# Patient Record
Sex: MALE | Race: WHITE | NOT HISPANIC OR LATINO | Employment: OTHER | URBAN - METROPOLITAN AREA
[De-identification: names, ages, dates, MRNs, and addresses within clinical notes are randomized per-mention and may not be internally consistent; named-entity substitution may affect disease eponyms.]

---

## 2023-10-18 DIAGNOSIS — B36.9 FUNGAL DERMATITIS: Primary | ICD-10-CM

## 2023-10-19 RX ORDER — NYSTATIN 100000 U/G
CREAM TOPICAL 2 TIMES DAILY
COMMUNITY
Start: 2023-05-19

## 2023-10-19 RX ORDER — NYSTATIN 100000 U/G
CREAM TOPICAL 2 TIMES DAILY
Refills: 3 | Status: CANCELLED | OUTPATIENT
Start: 2023-10-19 | End: 2023-11-18

## 2023-10-19 RX ORDER — TAMSULOSIN HYDROCHLORIDE 0.4 MG/1
0.4 CAPSULE ORAL DAILY
Qty: 90 CAPSULE | Refills: 3 | Status: CANCELLED | OUTPATIENT
Start: 2023-10-19 | End: 2024-10-18

## 2023-10-19 RX ORDER — NYSTATIN 100000 U/G
CREAM TOPICAL 2 TIMES DAILY
Qty: 60 G | Refills: 1 | Status: SHIPPED | OUTPATIENT
Start: 2023-10-19 | End: 2024-03-20

## 2024-01-22 DIAGNOSIS — I10 HYPERTENSION, UNSPECIFIED TYPE: Primary | ICD-10-CM

## 2024-01-23 RX ORDER — METOPROLOL TARTRATE 50 MG/1
50 TABLET ORAL 2 TIMES DAILY
Qty: 180 TABLET | Refills: 3 | Status: SHIPPED | OUTPATIENT
Start: 2024-01-23

## 2024-03-01 DIAGNOSIS — B36.9 FUNGAL DERMATITIS: ICD-10-CM

## 2024-03-20 RX ORDER — NYSTATIN 100000 U/G
CREAM TOPICAL 2 TIMES DAILY
Qty: 60 G | Refills: 1 | Status: SHIPPED | OUTPATIENT
Start: 2024-03-20

## 2024-03-29 ENCOUNTER — TELEPHONE (OUTPATIENT)
Dept: PRIMARY CARE | Facility: CLINIC | Age: 89
End: 2024-03-29
Payer: MEDICARE

## 2024-03-29 NOTE — TELEPHONE ENCOUNTER
Patients wife called wanting to know if you would be willing to do virtual appointments with Art.  She stated that he is bed ridden.

## 2024-04-03 ENCOUNTER — TELEMEDICINE (OUTPATIENT)
Dept: PRIMARY CARE | Facility: CLINIC | Age: 89
End: 2024-04-03
Payer: MEDICARE

## 2024-04-03 DIAGNOSIS — L98.491 CHRONIC SKIN ULCER, LIMITED TO BREAKDOWN OF SKIN (MULTI): Primary | ICD-10-CM

## 2024-04-03 PROCEDURE — 1157F ADVNC CARE PLAN IN RCRD: CPT | Performed by: FAMILY MEDICINE

## 2024-04-03 PROCEDURE — 99213 OFFICE O/P EST LOW 20 MIN: CPT | Performed by: FAMILY MEDICINE

## 2024-04-03 RX ORDER — DOXYCYCLINE 100 MG/1
100 CAPSULE ORAL 2 TIMES DAILY
Qty: 14 CAPSULE | Refills: 0 | Status: SHIPPED | OUTPATIENT
Start: 2024-04-03 | End: 2024-04-10

## 2024-04-03 RX ORDER — MUPIROCIN 20 MG/G
OINTMENT TOPICAL
Qty: 15 G | Refills: 0 | Status: SHIPPED | OUTPATIENT
Start: 2024-04-03 | End: 2024-04-13

## 2024-04-03 ASSESSMENT — ENCOUNTER SYMPTOMS: WOUND: 1

## 2024-04-03 NOTE — PROGRESS NOTES
Subjective   Patient ID: Reilly Feliz is a 88 y.o. male who presents for No chief complaint on file..    HPI   Reilly is presenting virtually today for concerns of skin lesions that have been there for a few months. Family has tried neosporin and hydrocortisone without relief.   Patient believes that it is painful to touch when the scab peels off.   Patient is in hospital bed, mostly bed bound.       Review of Systems   Skin:  Positive for wound.        Multiple non healing skin lesions.   All other systems reviewed and are negative.      Objective   There were no vitals taken for this visit.  Unable to attain due to nature of this visit     Physical Exam  Constitutional:       Appearance: Normal appearance.   HENT:      Head: Normocephalic.      Nose: Nose normal.   Eyes:      Conjunctiva/sclera: Conjunctivae normal.   Pulmonary:      Effort: Pulmonary effort is normal.   Skin:     Findings: Lesion present.      Comments: Multiple non healing skin lesions. Largest one on the left temple with scabbing  Left side of face and auricles with non healing lesion  Weeping lesion on right lateral thigh raised   Neurological:      Mental Status: He is alert. Mental status is at baseline.   Psychiatric:         Mood and Affect: Mood normal.         Behavior: Behavior normal.         Thought Content: Thought content normal.         Judgment: Judgment normal.         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 Take with at least 8 ounces (large glass) of water, do not lie down for 30 minutes after  -     mupirocin (Bactroban) 2 % ointment; Apply topically 3 times a day for 10 days.    Reilly is a 89yo M bed bound presenting virtually for non healing skin lesions that family previously tried hydrocortisone and neosporin creams.  An obstacle to  consider is the lack of transportation and mobility of the patient to present to dermatology for a formal evaluation and skin biopsy.   Concerns of squamous cell carcinoma vs ulceration vs infection  Antibiotic capsule and cream prescribed today as initial treatment option. Should it not provide any relief in 1 week will reassess how to transport patient to a dermatology office.   Explained to family our concerns and the issue of mobility of the patient.  Will reassess at next virtual visit    I have personally reviewed all available pertinent labs, imaging, and consult notes with the patient.     All questions and concerns were addressed. Patient verbalizes understanding instructions and agrees with established plan of care.     Patient seen and discussed with Dr. Geronimo Arvizu MD

## 2024-04-03 NOTE — PROGRESS NOTES
I reviewed and examined the patient. I was present for the key exam elements, and I fully participated in the patient's care. I discussed the management of the care with the resident. I have personally reviewed the pertinent labs and imaging, as well as recent notes, with the patient. I have reviewed the note above and agree with the resident's medical decision making as documented in the resident's note, in addition to the following comments / findings:     Agree with the rest of the plan outlined below by resident physician. No red flags.      The patient understands and agrees to the assessment and plan of care. Patient has also agreed to follow up and comply with the treatment and evaluation as recommended today. Patient was instructed to call the office at [phone] should questions arise regarding their treatment or care.     Paul Melton DO, FAOASM  Family Medicine   [address], [address]  [place] [address]     Paul Melton DO

## 2024-04-21 DIAGNOSIS — B36.9 FUNGAL DERMATITIS: ICD-10-CM

## 2024-04-23 RX ORDER — NYSTATIN 100000 U/G
CREAM TOPICAL 2 TIMES DAILY
Qty: 60 G | Refills: 1 | OUTPATIENT
Start: 2024-04-23

## 2024-04-25 ENCOUNTER — TELEPHONE (OUTPATIENT)
Dept: PRIMARY CARE | Facility: CLINIC | Age: 89
End: 2024-04-25
Payer: MEDICARE

## 2024-04-25 DIAGNOSIS — I63.9 CEREBROVASCULAR ACCIDENT (CVA), UNSPECIFIED MECHANISM (MULTI): Primary | ICD-10-CM

## 2024-04-25 NOTE — TELEPHONE ENCOUNTER
Patients wife called asking if you would write an order for Art for home health care.  They are going to try to use [facility]

## 2024-04-26 ENCOUNTER — HOME HEALTH ADMISSION (OUTPATIENT)
Dept: HOME HEALTH SERVICES | Facility: HOME HEALTH | Age: 89
End: 2024-04-26
Payer: MEDICARE

## 2024-04-26 ENCOUNTER — TELEPHONE (OUTPATIENT)
Dept: HOME HEALTH SERVICES | Facility: HOME HEALTH | Age: 89
End: 2024-04-26
Payer: MEDICARE

## 2024-04-26 ENCOUNTER — DOCUMENTATION (OUTPATIENT)
Dept: HOME HEALTH SERVICES | Facility: HOME HEALTH | Age: 89
End: 2024-04-26
Payer: MEDICARE

## 2024-04-26 NOTE — TELEPHONE ENCOUNTER
I you referred this pt to [facility] for a HHA only and pt would have to have a skilled need for home care ie. SN, PT, OT. After reviewing the chart I see this referral was for [facility] home care and was sent to [facility] in error. We will make this pt a non admit. Thanks

## 2024-04-26 NOTE — TELEPHONE ENCOUNTER
I you referred this pt to [facility] for a HHA only and pt would have to have a skilled need for home care ie. SN, PT, OT. After reviewing the chart I see this referral was for [facility] home care and was sent to [facility] in error. We will make this pt a non admit. Thanks

## 2024-04-29 NOTE — TELEPHONE ENCOUNTER
Called patients wife and let her know.   [facility] does not provide all the services on referral.  I called [facility] home care and they are going to start the process on their end and call patient and let her know

## 2024-04-30 ENCOUNTER — DOCUMENTATION (OUTPATIENT)
Dept: HOME HEALTH SERVICES | Facility: HOME HEALTH | Age: 89
End: 2024-04-30

## 2024-04-30 ENCOUNTER — TELEPHONE (OUTPATIENT)
Dept: HOME HEALTH SERVICES | Facility: HOME HEALTH | Age: 89
End: 2024-04-30

## 2024-04-30 NOTE — HH CARE COORDINATION
Home Care received a referral for Home Health Aide. Unfortunately, we are unable to accept and process the referral at this time.    Patients, please reach out to the referring provider or your PCP to assist in obtaining an alternative home care agency and/or guidance to meet your needs.    Providers, please reach out to  Home Care with any questions regarding the declined referral.

## 2024-04-30 NOTE — TELEPHONE ENCOUNTER
04/30/2024 Hello, We received a referral for [facility] for HHA. [facility] provides skilled services only. If there is a need for skilled services for this patient please submit a new referral with skilled disciplines ordered. The existing referral has been closed. Thank you, Amy

## 2024-05-02 ENCOUNTER — TELEPHONE (OUTPATIENT)
Dept: HOME HEALTH SERVICES | Facility: HOME HEALTH | Age: 89
End: 2024-05-02

## 2024-05-02 ENCOUNTER — HOME HEALTH ADMISSION (OUTPATIENT)
Dept: HOME HEALTH SERVICES | Facility: HOME HEALTH | Age: 89
End: 2024-05-02
Payer: MEDICARE

## 2024-05-02 ENCOUNTER — DOCUMENTATION (OUTPATIENT)
Dept: HOME HEALTH SERVICES | Facility: HOME HEALTH | Age: 89
End: 2024-05-02

## 2024-05-02 DIAGNOSIS — I63.9 CEREBROVASCULAR ACCIDENT (CVA), UNSPECIFIED MECHANISM (MULTI): Primary | ICD-10-CM

## 2024-05-02 NOTE — TELEPHONE ENCOUNTER
Moises Melton,     Thank you for the new referral for Reilly Feliz. Please be advised that we do not currently have Home Health Aide and Speech Language Pathology in this service area. We can process the referral for all other disciplines at this time. If the missing disciplines are required, please send this referral to another agency.     Thank you,  [facility] Intake

## 2024-05-02 NOTE — HH CARE COORDINATION
Home Care received a referral for Nursing, Physical Therapy, Occupational Therapy, Home Health Aide, and Speech Language Pathology. Unfortunately, we are unable to accept and process the referral at this time.    Patients, please reach out to the referring provider or your PCP to assist in obtaining an alternative home care agency and/or guidance to meet your needs.    Providers, please reach out to  Home Care with any questions regarding the declined referral.

## 2024-05-06 DIAGNOSIS — I63.9 CEREBROVASCULAR ACCIDENT (CVA), UNSPECIFIED MECHANISM (MULTI): Primary | ICD-10-CM

## 2024-05-07 ENCOUNTER — TELEPHONE (OUTPATIENT)
Dept: HOME HEALTH SERVICES | Facility: HOME HEALTH | Age: 89
End: 2024-05-07

## 2024-05-07 DIAGNOSIS — I63.9 CEREBROVASCULAR ACCIDENT (CVA), UNSPECIFIED MECHANISM (MULTI): Primary | ICD-10-CM

## 2024-05-07 NOTE — TELEPHONE ENCOUNTER
Hello, We received a referral for [facility] for HHA without any other skilled needs(SN,PT,OT).  [facility] requires skilled services additional to receiving HHA.  If there is a need for skilled services for this patient please submit a new referral with skilled disciplines ordered. Also [facility] currently do not have any HHA available to service pts zip code area. We apologize for any inconvenience. The existing referral has been closed. Thank you, Mary Jo

## 2024-05-13 DIAGNOSIS — B36.9 FUNGAL DERMATITIS: ICD-10-CM

## 2024-05-13 RX ORDER — NYSTATIN 100000 U/G
CREAM TOPICAL 2 TIMES DAILY
Qty: 60 G | Refills: 0 | Status: SHIPPED | OUTPATIENT
Start: 2024-05-13

## 2024-05-15 RX ORDER — MUPIROCIN 20 MG/G
OINTMENT TOPICAL 3 TIMES DAILY
Qty: 22 G | Refills: 0 | OUTPATIENT
Start: 2024-05-15 | End: 2024-05-25

## 2024-05-15 NOTE — TELEPHONE ENCOUNTER
Patient needs refill on Mupirocin 2% ointment apply three times a day   22  Pharmacy [facility]

## 2024-05-21 DIAGNOSIS — L03.90 CELLULITIS, UNSPECIFIED CELLULITIS SITE: Primary | ICD-10-CM

## 2024-05-21 RX ORDER — MUPIROCIN CALCIUM 20 MG/G
CREAM TOPICAL 3 TIMES DAILY
Qty: 15 G | Refills: 0 | Status: SHIPPED | OUTPATIENT
Start: 2024-05-21 | End: 2024-05-24

## 2024-05-21 NOTE — TELEPHONE ENCOUNTER
Patient is asking for mupirocin 2% ointment apply three times a day.  Does he still need to use?  Spot is better but the spot is still there.  Pharmacy [facility]

## 2024-05-22 DIAGNOSIS — L03.90 CELLULITIS, UNSPECIFIED CELLULITIS SITE: ICD-10-CM

## 2024-05-22 RX ORDER — MUPIROCIN CALCIUM 20 MG/G
CREAM TOPICAL 3 TIMES DAILY
Qty: 15 G | Refills: 0 | OUTPATIENT
Start: 2024-05-22 | End: 2024-06-01

## 2024-05-23 DIAGNOSIS — L03.90 CELLULITIS, UNSPECIFIED CELLULITIS SITE: ICD-10-CM

## 2024-05-24 RX ORDER — MUPIROCIN CALCIUM 20 MG/G
CREAM TOPICAL 3 TIMES DAILY
Qty: 15 G | Refills: 0 | Status: SHIPPED | OUTPATIENT
Start: 2024-05-24 | End: 2024-06-03

## 2024-06-05 ENCOUNTER — TELEPHONE (OUTPATIENT)
Dept: PRIMARY CARE | Facility: CLINIC | Age: 89
End: 2024-06-05
Payer: MEDICARE

## 2024-06-05 NOTE — TELEPHONE ENCOUNTER
Patient's wife called asking for a new order for Homehealth.  You had put one in prior but patient's insurance only has certain ones that are in network for him.  Wife will get fax number to fax in order.  Fax number is [phone]

## 2024-07-12 DIAGNOSIS — B36.9 FUNGAL DERMATITIS: ICD-10-CM

## 2024-07-12 RX ORDER — NYSTATIN 100000 U/G
CREAM TOPICAL 2 TIMES DAILY
Qty: 60 G | Refills: 3 | Status: SHIPPED | OUTPATIENT
Start: 2024-07-12

## 2024-08-13 ENCOUNTER — TELEPHONE (OUTPATIENT)
Dept: PRIMARY CARE | Facility: CLINIC | Age: 89
End: 2024-08-13
Payer: MEDICARE

## 2024-08-13 NOTE — TELEPHONE ENCOUNTER
[facility] Nurse Heather called because she had spoken to the patient's wife and he has a bad sore on his back.  Patient is bed bound and insurance said they would contact PCP and see if you could put an order in for that.

## 2024-08-14 NOTE — TELEPHONE ENCOUNTER
Dr Melton wrote up order for home health nurse.  Spoke with insurance they will set up for a case advisor that will contact patient to help set him up with home health that is on patient's insurance

## 2024-08-26 ENCOUNTER — TELEPHONE (OUTPATIENT)
Dept: PRIMARY CARE | Facility: CLINIC | Age: 89
End: 2024-08-26
Payer: MEDICARE

## 2024-08-26 NOTE — TELEPHONE ENCOUNTER
Wife called wanted order for home care faxed to another facilitly.  Faxed to [facility] fax # [phone]

## 2024-08-30 ENCOUNTER — TELEMEDICINE (OUTPATIENT)
Dept: PRIMARY CARE | Facility: CLINIC | Age: 89
End: 2024-08-30
Payer: MEDICARE

## 2024-08-30 DIAGNOSIS — Z74.01 BED CONFINEMENT STATUS: ICD-10-CM

## 2024-08-30 DIAGNOSIS — S31.000D WOUND OF SACRAL REGION, SUBSEQUENT ENCOUNTER: ICD-10-CM

## 2024-08-30 DIAGNOSIS — G20.A1 PARKINSON'S DISEASE WITHOUT FLUCTUATING MANIFESTATIONS, UNSPECIFIED WHETHER DYSKINESIA PRESENT (MULTI): ICD-10-CM

## 2024-08-30 DIAGNOSIS — L98.491 CHRONIC SKIN ULCER, LIMITED TO BREAKDOWN OF SKIN (MULTI): Primary | ICD-10-CM

## 2024-08-30 ASSESSMENT — ENCOUNTER SYMPTOMS
ACTIVITY CHANGE: 1
WEAKNESS: 1
CONSTIPATION: 1
UNEXPECTED WEIGHT CHANGE: 0
APPETITE CHANGE: 1
SHORTNESS OF BREATH: 0

## 2024-08-30 NOTE — PROGRESS NOTES
Chief Complaint Reilly Feliz is a 88 y.o. male who presents via virtual telehealth visit for help for home health care.     HPI:  Both patient and his wife gave history. Patient stated that he has Parkinson's Disease and now he is bed bound because of it. The wife endorses that he became bed bound after having COVID and has continued to deteriorate to the point where he cannot get out of his hospital bed and because of this the patient has skin issues and pressure sores. Patient wife stated that they would like home health care to help her take care of him and to help treat his pressure wounds. They would also need PT and OT for strengthening.       ROS:  Review of Systems   Constitutional:  Positive for activity change and appetite change. Negative for unexpected weight change.   Respiratory:  Negative for shortness of breath.    Cardiovascular:  Negative for chest pain.   Gastrointestinal:  Positive for constipation.   Neurological:  Positive for weakness.       Meds:    Current Outpatient Medications:     carbidopa-levodopa (Sinemet)  mg tablet, TAKE 1 TABLET BY MOUTH 6 TIMES A DAY, Disp: 540 tablet, Rfl: 2    metFORMIN (Glucophage) 500 mg tablet, TAKE 1 TABLET TWICE DAILY, Disp: 180 tablet, Rfl: 3    metoprolol tartrate (Lopressor) 50 mg tablet, TAKE 1 TABLET TWICE DAILY, Disp: 180 tablet, Rfl: 3    nystatin (Mycostatin) cream, Apply topically 2 times a day., Disp: , Rfl:     nystatin (Mycostatin) cream, APPLY TO AFFECTED AREA TWICE A DAY, Disp: 60 g, Rfl: 1    nystatin (Mycostatin) cream, APPLY TO AFFECTED AREA TWICE A DAY, Disp: 60 g, Rfl: 3    Allergies:  No Known Allergies    PE:  There were no vitals taken for this visit.  Limited due to virtual telehealth vist.      Assessment/Plan  Reilly Feliz is a 88 y.o. male who presents for referral for home health care. Discussed the current bed bound status of the patient with him and his wife. Would recommend both Healthy at Home program for a  physician to come in to see him to help evaluate his needs and manage his care. It was also recommended that he get home health care with PT, OT, Wound Care, Home Health Aide, and Skilled Nursing. Also due to the patient's poor prognosis for recovery with the Parkinson's it was recommended that they meet with palliative care to have a goals of care discussion. There was also an order for a pressure support mattress. Patient and his family agreed with treatment and plan.     Diagnoses and all orders for this visit:  Chronic skin ulcer, limited to breakdown of skin (Multi) (Primary)  -     Referral to Healthy at Home Program; Future  -     General supply request Pressure Support Mattress  Parkinson's disease without fluctuating manifestations, unspecified whether dyskinesia present (Multi)  -     Referral to Home Care; Future  -     Referral to Healthy at Home Program; Future  -     General supply request Pressure Support Mattress  -     Referral to Palliative Care; Future  -     Referral to Home Care; Future  Wound of sacral region, subsequent encounter  Bed confinement status  -     Referral to Home Care; Future  -     Referral to Healthy at Home Program; Future  -     Referral to Palliative Care; Future  -     Referral to Home Care; Future         Follow up in one week.      Patient was staffed with Dr. Geronimo Jacome DO, PGY-2  [facility] Family Medicine

## 2024-08-31 NOTE — PROGRESS NOTES
I reviewed and examined the patient. I was present for the key exam elements, and I fully participated in the patient's care. I discussed the management of the care with the resident. I have personally reviewed the pertinent labs and imaging, as well as recent notes, with the patient. I have reviewed the note above and agree with the resident's medical decision making as documented in the resident's note, in addition to the following comments / findings:     Agree with the rest of the plan outlined below by resident physician. No red flags.      The patient understands and agrees to the assessment and plan of care. Patient has also agreed to follow up and comply with the treatment and evaluation as recommended today. Patient was instructed to call the office at [phone] should questions arise regarding their treatment or care.     Paul Melton DO, FAOASM  Family Medicine   [address], [address]  [place] [address]     Paul Melton DO

## 2024-09-03 ENCOUNTER — TELEPHONE (OUTPATIENT)
Dept: HOME HEALTH SERVICES | Facility: HOME HEALTH | Age: 89
End: 2024-09-03
Payer: MEDICARE

## 2024-09-03 ENCOUNTER — PATIENT OUTREACH (OUTPATIENT)
Dept: HOME HEALTH SERVICES | Age: 89
End: 2024-09-03
Payer: MEDICARE

## 2024-09-03 NOTE — TELEPHONE ENCOUNTER
Good morning, [facility] has a referral for this patient, however they have already been accepted and contacted by [facility] confirmed by telephone call so we will close our referral.    Thank you,   [facility] Intake

## 2024-09-09 ENCOUNTER — TELEPHONE (OUTPATIENT)
Dept: PRIMARY CARE | Facility: CLINIC | Age: 89
End: 2024-09-09
Payer: MEDICARE

## 2024-09-09 NOTE — TELEPHONE ENCOUNTER
[facility] ([facility] [phone]) called regarding patient and palliative care.  They need order, recent office note, Insurance card.  Faxed to [phone]

## 2024-09-10 DIAGNOSIS — I63.9 CEREBROVASCULAR ACCIDENT (CVA), UNSPECIFIED MECHANISM (MULTI): Primary | ICD-10-CM

## 2024-10-31 DIAGNOSIS — E11.9 TYPE 2 DIABETES MELLITUS WITHOUT COMPLICATION, WITHOUT LONG-TERM CURRENT USE OF INSULIN (MULTI): ICD-10-CM

## 2024-11-01 RX ORDER — METFORMIN HYDROCHLORIDE 500 MG/1
500 TABLET ORAL 2 TIMES DAILY
Qty: 180 TABLET | Refills: 3 | Status: SHIPPED | OUTPATIENT
Start: 2024-11-01

## 2024-11-15 DIAGNOSIS — I10 HYPERTENSION, UNSPECIFIED TYPE: ICD-10-CM

## 2024-11-22 RX ORDER — METOPROLOL TARTRATE 50 MG/1
50 TABLET ORAL 2 TIMES DAILY
Qty: 180 TABLET | Refills: 3 | Status: SHIPPED | OUTPATIENT
Start: 2024-11-22

## 2025-02-28 ENCOUNTER — TELEPHONE (OUTPATIENT)
Dept: PRIMARY CARE | Facility: CLINIC | Age: 89
End: 2025-02-28
Payer: MEDICARE

## 2025-02-28 NOTE — TELEPHONE ENCOUNTER
Patient has a home care nurse who is worried patient has pneumonia and was wondering what action you suggest he take       [id number]

## 2025-03-06 ENCOUNTER — APPOINTMENT (OUTPATIENT)
Dept: CARDIOLOGY | Facility: HOSPITAL | Age: 89
DRG: 177 | End: 2025-03-06
Payer: MEDICARE

## 2025-03-06 ENCOUNTER — APPOINTMENT (OUTPATIENT)
Dept: RADIOLOGY | Facility: HOSPITAL | Age: 89
DRG: 177 | End: 2025-03-06
Payer: MEDICARE

## 2025-03-06 ENCOUNTER — HOSPITAL ENCOUNTER (INPATIENT)
Facility: HOSPITAL | Age: 89
End: 2025-03-06
Attending: EMERGENCY MEDICINE | Admitting: INTERNAL MEDICINE
Payer: MEDICARE

## 2025-03-06 DIAGNOSIS — L98.9 FACE LESION: ICD-10-CM

## 2025-03-06 DIAGNOSIS — Z98.61 CAD S/P PERCUTANEOUS CORONARY ANGIOPLASTY: Chronic | ICD-10-CM

## 2025-03-06 DIAGNOSIS — J90 PLEURAL EFFUSION ON LEFT: ICD-10-CM

## 2025-03-06 DIAGNOSIS — R09.02 HYPOXIA: Primary | ICD-10-CM

## 2025-03-06 DIAGNOSIS — I25.10 CAD S/P PERCUTANEOUS CORONARY ANGIOPLASTY: Chronic | ICD-10-CM

## 2025-03-06 DIAGNOSIS — I48.20 CHRONIC A-FIB (MULTI): Chronic | ICD-10-CM

## 2025-03-06 DIAGNOSIS — J90 PLEURAL EFFUSION, LEFT: ICD-10-CM

## 2025-03-06 PROBLEM — J69.0 ASPIRATION PNEUMONIA (MULTI): Status: ACTIVE | Noted: 2025-03-06

## 2025-03-06 PROBLEM — E11.9 TYPE 2 DIABETES MELLITUS, WITHOUT LONG-TERM CURRENT USE OF INSULIN (MULTI): Chronic | Status: ACTIVE | Noted: 2025-03-06

## 2025-03-06 PROBLEM — I95.9 HYPOTENSION: Status: ACTIVE | Noted: 2025-03-06

## 2025-03-06 PROBLEM — J18.9 PNEUMONIA DUE TO INFECTIOUS ORGANISM: Status: ACTIVE | Noted: 2025-03-06

## 2025-03-06 PROBLEM — G20.A1 PARKINSON DISEASE (MULTI): Chronic | Status: ACTIVE | Noted: 2025-03-06

## 2025-03-06 PROBLEM — I10 HTN (HYPERTENSION): Chronic | Status: ACTIVE | Noted: 2025-03-06

## 2025-03-06 LAB
ALBUMIN SERPL BCP-MCNC: 3.4 G/DL (ref 3.4–5)
ALP SERPL-CCNC: 73 U/L (ref 33–136)
ALT SERPL W P-5'-P-CCNC: <3 U/L (ref 10–52)
ANION GAP BLDV CALCULATED.4IONS-SCNC: 7 MMOL/L (ref 10–25)
ANION GAP SERPL CALC-SCNC: 13 MMOL/L (ref 10–20)
APPEARANCE UR: ABNORMAL
AST SERPL W P-5'-P-CCNC: 6 U/L (ref 9–39)
BACTERIA #/AREA URNS AUTO: ABNORMAL /HPF
BASE EXCESS BLDV CALC-SCNC: 2 MMOL/L (ref -2–3)
BASOPHILS # BLD AUTO: 0.03 X10*3/UL (ref 0–0.1)
BASOPHILS NFR BLD AUTO: 0.2 %
BILIRUB SERPL-MCNC: 1.6 MG/DL (ref 0–1.2)
BILIRUB UR STRIP.AUTO-MCNC: NEGATIVE MG/DL
BNP SERPL-MCNC: 110 PG/ML (ref 0–99)
BODY TEMPERATURE: ABNORMAL
BUN SERPL-MCNC: 26 MG/DL (ref 6–23)
CA-I BLDV-SCNC: 1.19 MMOL/L (ref 1.1–1.33)
CALCIUM SERPL-MCNC: 8.8 MG/DL (ref 8.6–10.3)
CARDIAC TROPONIN I PNL SERPL HS: 6 NG/L (ref 0–20)
CHLORIDE BLDV-SCNC: 97 MMOL/L (ref 98–107)
CHLORIDE SERPL-SCNC: 99 MMOL/L (ref 98–107)
CO2 SERPL-SCNC: 26 MMOL/L (ref 21–32)
COLOR UR: YELLOW
CREAT SERPL-MCNC: 0.98 MG/DL (ref 0.5–1.3)
EGFRCR SERPLBLD CKD-EPI 2021: 74 ML/MIN/1.73M*2
EOSINOPHIL # BLD AUTO: 0.01 X10*3/UL (ref 0–0.4)
EOSINOPHIL NFR BLD AUTO: 0.1 %
ERYTHROCYTE [DISTWIDTH] IN BLOOD BY AUTOMATED COUNT: 16.1 % (ref 11.5–14.5)
FLUAV RNA RESP QL NAA+PROBE: NOT DETECTED
FLUBV RNA RESP QL NAA+PROBE: NOT DETECTED
GLUCOSE BLDV-MCNC: 174 MG/DL (ref 74–99)
GLUCOSE SERPL-MCNC: 155 MG/DL (ref 74–99)
GLUCOSE UR STRIP.AUTO-MCNC: NORMAL MG/DL
HCO3 BLDV-SCNC: 29.7 MMOL/L (ref 22–26)
HCT VFR BLD AUTO: 38 % (ref 41–52)
HCT VFR BLD EST: 58 % (ref 41–52)
HGB BLD-MCNC: 12 G/DL (ref 13.5–17.5)
HGB BLDV-MCNC: 19.2 G/DL (ref 13.5–17.5)
HYALINE CASTS #/AREA URNS AUTO: ABNORMAL /LPF
IMM GRANULOCYTES # BLD AUTO: 0.1 X10*3/UL (ref 0–0.5)
IMM GRANULOCYTES NFR BLD AUTO: 0.8 % (ref 0–0.9)
INHALED O2 CONCENTRATION: 100 %
INR PPP: 2.9 (ref 0.9–1.1)
KETONES UR STRIP.AUTO-MCNC: ABNORMAL MG/DL
LACTATE BLDV-SCNC: 3.1 MMOL/L (ref 0.4–2)
LACTATE SERPL-SCNC: 2.5 MMOL/L (ref 0.4–2)
LACTATE SERPL-SCNC: 2.6 MMOL/L (ref 0.4–2)
LEUKOCYTE ESTERASE UR QL STRIP.AUTO: ABNORMAL
LYMPHOCYTES # BLD AUTO: 0.93 X10*3/UL (ref 0.8–3)
LYMPHOCYTES NFR BLD AUTO: 7.6 %
MAGNESIUM SERPL-MCNC: 1.77 MG/DL (ref 1.6–2.4)
MCH RBC QN AUTO: 29.1 PG (ref 26–34)
MCHC RBC AUTO-ENTMCNC: 31.6 G/DL (ref 32–36)
MCV RBC AUTO: 92 FL (ref 80–100)
MONOCYTES # BLD AUTO: 1.38 X10*3/UL (ref 0.05–0.8)
MONOCYTES NFR BLD AUTO: 11.3 %
MUCOUS THREADS #/AREA URNS AUTO: ABNORMAL /LPF
NEUTROPHILS # BLD AUTO: 9.72 X10*3/UL (ref 1.6–5.5)
NEUTROPHILS NFR BLD AUTO: 80 %
NITRITE UR QL STRIP.AUTO: NEGATIVE
NRBC BLD-RTO: 0 /100 WBCS (ref 0–0)
OXYHGB MFR BLDV: 28 % (ref 45–75)
PCO2 BLDV: 55 MM HG (ref 41–51)
PH BLDV: 7.34 PH (ref 7.33–7.43)
PH UR STRIP.AUTO: 5 [PH]
PLATELET # BLD AUTO: 327 X10*3/UL (ref 150–450)
PO2 BLDV: 17 MM HG (ref 35–45)
POTASSIUM BLDV-SCNC: 4.8 MMOL/L (ref 3.5–5.3)
POTASSIUM SERPL-SCNC: 5.3 MMOL/L (ref 3.5–5.3)
PROT SERPL-MCNC: 6.8 G/DL (ref 6.4–8.2)
PROT UR STRIP.AUTO-MCNC: ABNORMAL MG/DL
PROTHROMBIN TIME: 32.3 SECONDS (ref 9.8–12.4)
RBC # BLD AUTO: 4.12 X10*6/UL (ref 4.5–5.9)
RBC # UR STRIP.AUTO: ABNORMAL MG/DL
RBC #/AREA URNS AUTO: ABNORMAL /HPF
RSV RNA RESP QL NAA+PROBE: NOT DETECTED
SAO2 % BLDV: 29 % (ref 45–75)
SARS-COV-2 RNA RESP QL NAA+PROBE: NOT DETECTED
SODIUM BLDV-SCNC: 129 MMOL/L (ref 136–145)
SODIUM SERPL-SCNC: 133 MMOL/L (ref 136–145)
SP GR UR STRIP.AUTO: 1.03
UROBILINOGEN UR STRIP.AUTO-MCNC: ABNORMAL MG/DL
WBC # BLD AUTO: 12.2 X10*3/UL (ref 4.4–11.3)
WBC #/AREA URNS AUTO: ABNORMAL /HPF

## 2025-03-06 PROCEDURE — 99292 CRITICAL CARE ADDL 30 MIN: CPT | Performed by: HEALTH CARE PROVIDER

## 2025-03-06 PROCEDURE — 36415 COLL VENOUS BLD VENIPUNCTURE: CPT | Performed by: HEALTH CARE PROVIDER

## 2025-03-06 PROCEDURE — 2500000004 HC RX 250 GENERAL PHARMACY W/ HCPCS (ALT 636 FOR OP/ED): Performed by: BEHAVIOR TECHNICIAN

## 2025-03-06 PROCEDURE — 83036 HEMOGLOBIN GLYCOSYLATED A1C: CPT | Mod: GEALAB | Performed by: BEHAVIOR TECHNICIAN

## 2025-03-06 PROCEDURE — 84132 ASSAY OF SERUM POTASSIUM: CPT | Performed by: HEALTH CARE PROVIDER

## 2025-03-06 PROCEDURE — 71045 X-RAY EXAM CHEST 1 VIEW: CPT | Mod: FOREIGN READ | Performed by: RADIOLOGY

## 2025-03-06 PROCEDURE — 85610 PROTHROMBIN TIME: CPT | Performed by: HEALTH CARE PROVIDER

## 2025-03-06 PROCEDURE — 83735 ASSAY OF MAGNESIUM: CPT | Performed by: HEALTH CARE PROVIDER

## 2025-03-06 PROCEDURE — 2500000004 HC RX 250 GENERAL PHARMACY W/ HCPCS (ALT 636 FOR OP/ED): Performed by: HEALTH CARE PROVIDER

## 2025-03-06 PROCEDURE — 87075 CULTR BACTERIA EXCEPT BLOOD: CPT | Mod: GEALAB | Performed by: BEHAVIOR TECHNICIAN

## 2025-03-06 PROCEDURE — 83605 ASSAY OF LACTIC ACID: CPT | Performed by: BEHAVIOR TECHNICIAN

## 2025-03-06 PROCEDURE — 85025 COMPLETE CBC W/AUTO DIFF WBC: CPT | Performed by: HEALTH CARE PROVIDER

## 2025-03-06 PROCEDURE — 71045 X-RAY EXAM CHEST 1 VIEW: CPT

## 2025-03-06 PROCEDURE — 81001 URINALYSIS AUTO W/SCOPE: CPT | Performed by: HEALTH CARE PROVIDER

## 2025-03-06 PROCEDURE — P9612 CATHETERIZE FOR URINE SPEC: HCPCS

## 2025-03-06 PROCEDURE — 84484 ASSAY OF TROPONIN QUANT: CPT | Performed by: HEALTH CARE PROVIDER

## 2025-03-06 PROCEDURE — 96368 THER/DIAG CONCURRENT INF: CPT

## 2025-03-06 PROCEDURE — 99285 EMERGENCY DEPT VISIT HI MDM: CPT | Performed by: EMERGENCY MEDICINE

## 2025-03-06 PROCEDURE — 87040 BLOOD CULTURE FOR BACTERIA: CPT | Mod: GEALAB | Performed by: BEHAVIOR TECHNICIAN

## 2025-03-06 PROCEDURE — 82947 ASSAY GLUCOSE BLOOD QUANT: CPT

## 2025-03-06 PROCEDURE — 80053 COMPREHEN METABOLIC PANEL: CPT | Performed by: HEALTH CARE PROVIDER

## 2025-03-06 PROCEDURE — 87086 URINE CULTURE/COLONY COUNT: CPT | Mod: GEALAB | Performed by: HEALTH CARE PROVIDER

## 2025-03-06 PROCEDURE — 87637 SARSCOV2&INF A&B&RSV AMP PRB: CPT | Performed by: HEALTH CARE PROVIDER

## 2025-03-06 PROCEDURE — 83880 ASSAY OF NATRIURETIC PEPTIDE: CPT | Performed by: HEALTH CARE PROVIDER

## 2025-03-06 PROCEDURE — 83605 ASSAY OF LACTIC ACID: CPT | Performed by: HEALTH CARE PROVIDER

## 2025-03-06 PROCEDURE — 1200000002 HC GENERAL ROOM WITH TELEMETRY DAILY

## 2025-03-06 PROCEDURE — 96365 THER/PROPH/DIAG IV INF INIT: CPT

## 2025-03-06 PROCEDURE — 2500000001 HC RX 250 WO HCPCS SELF ADMINISTERED DRUGS (ALT 637 FOR MEDICARE OP): Performed by: BEHAVIOR TECHNICIAN

## 2025-03-06 PROCEDURE — 87636 SARSCOV2 & INF A&B AMP PRB: CPT | Performed by: HEALTH CARE PROVIDER

## 2025-03-06 PROCEDURE — 2500000005 HC RX 250 GENERAL PHARMACY W/O HCPCS: Performed by: HEALTH CARE PROVIDER

## 2025-03-06 PROCEDURE — 93005 ELECTROCARDIOGRAM TRACING: CPT

## 2025-03-06 RX ORDER — WARFARIN SODIUM 5 MG/1
5 TABLET ORAL DAILY
Status: ACTIVE | OUTPATIENT
Start: 2025-03-07

## 2025-03-06 RX ORDER — INSULIN LISPRO 100 [IU]/ML
0-5 INJECTION, SOLUTION INTRAVENOUS; SUBCUTANEOUS
Status: DISPENSED | OUTPATIENT
Start: 2025-03-07

## 2025-03-06 RX ORDER — RAMIPRIL 5 MG/1
5 CAPSULE ORAL DAILY
Status: ON HOLD | COMMUNITY

## 2025-03-06 RX ORDER — CARBIDOPA AND LEVODOPA 25; 100 MG/1; MG/1
1 TABLET ORAL
Status: DISPENSED | OUTPATIENT
Start: 2025-03-06

## 2025-03-06 RX ORDER — CEFTRIAXONE 2 G/50ML
2 INJECTION, SOLUTION INTRAVENOUS ONCE
Status: COMPLETED | OUTPATIENT
Start: 2025-03-06 | End: 2025-03-06

## 2025-03-06 RX ORDER — AMOXICILLIN 250 MG
2 CAPSULE ORAL 2 TIMES DAILY
Status: DISPENSED | OUTPATIENT
Start: 2025-03-06

## 2025-03-06 RX ORDER — WARFARIN SODIUM 5 MG/1
5 TABLET ORAL
Status: ON HOLD | COMMUNITY

## 2025-03-06 RX ORDER — ASPIRIN 81 MG/1
81 TABLET ORAL DAILY
Status: DISPENSED | OUTPATIENT
Start: 2025-03-07

## 2025-03-06 RX ORDER — ASPIRIN 81 MG/1
81 TABLET ORAL DAILY
Status: ON HOLD | COMMUNITY

## 2025-03-06 RX ORDER — ONDANSETRON 4 MG/1
4 TABLET, FILM COATED ORAL EVERY 8 HOURS PRN
Status: ACTIVE | OUTPATIENT
Start: 2025-03-06

## 2025-03-06 RX ORDER — ONDANSETRON HYDROCHLORIDE 2 MG/ML
4 INJECTION, SOLUTION INTRAVENOUS EVERY 8 HOURS PRN
Status: ACTIVE | OUTPATIENT
Start: 2025-03-06

## 2025-03-06 RX ORDER — CEFTRIAXONE 2 G/50ML
2 INJECTION, SOLUTION INTRAVENOUS EVERY 24 HOURS
Status: DISCONTINUED | OUTPATIENT
Start: 2025-03-07 | End: 2025-03-06

## 2025-03-06 RX ORDER — ALBUTEROL SULFATE 0.83 MG/ML
2.5 SOLUTION RESPIRATORY (INHALATION) EVERY 2 HOUR PRN
Status: ACTIVE | OUTPATIENT
Start: 2025-03-06

## 2025-03-06 RX ORDER — ALBUTEROL SULFATE 0.83 MG/ML
3 SOLUTION RESPIRATORY (INHALATION)
Status: DISCONTINUED | OUTPATIENT
Start: 2025-03-06 | End: 2025-03-06

## 2025-03-06 RX ORDER — POLYETHYLENE GLYCOL 3350 17 G/17G
17 POWDER, FOR SOLUTION ORAL DAILY
Status: DISPENSED | OUTPATIENT
Start: 2025-03-07

## 2025-03-06 RX ORDER — DEXTROSE 50 % IN WATER (D50W) INTRAVENOUS SYRINGE
12.5
Status: ACTIVE | OUTPATIENT
Start: 2025-03-06

## 2025-03-06 RX ORDER — ALBUTEROL SULFATE 0.83 MG/ML
2.5 SOLUTION RESPIRATORY (INHALATION) EVERY 6 HOURS PRN
Status: DISCONTINUED | OUTPATIENT
Start: 2025-03-06 | End: 2025-03-06

## 2025-03-06 RX ORDER — IPRATROPIUM BROMIDE AND ALBUTEROL SULFATE 2.5; .5 MG/3ML; MG/3ML
3 SOLUTION RESPIRATORY (INHALATION)
Status: DISCONTINUED | OUTPATIENT
Start: 2025-03-06 | End: 2025-03-06

## 2025-03-06 RX ORDER — DEXTROSE 50 % IN WATER (D50W) INTRAVENOUS SYRINGE
25
Status: ACTIVE | OUTPATIENT
Start: 2025-03-06

## 2025-03-06 RX ORDER — METOPROLOL TARTRATE 50 MG/1
50 TABLET ORAL 2 TIMES DAILY
Status: DISPENSED | OUTPATIENT
Start: 2025-03-06

## 2025-03-06 RX ORDER — LISINOPRIL 10 MG/1
10 TABLET ORAL DAILY
Status: ACTIVE | OUTPATIENT
Start: 2025-03-07

## 2025-03-06 RX ORDER — CEFTRIAXONE 1 G/50ML
1 INJECTION, SOLUTION INTRAVENOUS EVERY 24 HOURS
Status: DISPENSED | OUTPATIENT
Start: 2025-03-07

## 2025-03-06 RX ORDER — ALBUTEROL SULFATE 0.83 MG/ML
3 SOLUTION RESPIRATORY (INHALATION)
Status: DISPENSED | OUTPATIENT
Start: 2025-03-07

## 2025-03-06 RX ORDER — TALC
9 POWDER (GRAM) TOPICAL NIGHTLY PRN
Status: DISPENSED | OUTPATIENT
Start: 2025-03-06

## 2025-03-06 RX ADMIN — SODIUM CHLORIDE, POTASSIUM CHLORIDE, SODIUM LACTATE AND CALCIUM CHLORIDE 1000 ML: 600; 310; 30; 20 INJECTION, SOLUTION INTRAVENOUS at 18:43

## 2025-03-06 RX ADMIN — AZITHROMYCIN MONOHYDRATE 500 MG: 500 INJECTION, POWDER, LYOPHILIZED, FOR SOLUTION INTRAVENOUS at 17:45

## 2025-03-06 RX ADMIN — SODIUM CHLORIDE 500 ML: 9 INJECTION, SOLUTION INTRAVENOUS at 21:49

## 2025-03-06 RX ADMIN — Medication 5 L/MIN: at 18:02

## 2025-03-06 RX ADMIN — SENNOSIDES AND DOCUSATE SODIUM 2 TABLET: 50; 8.6 TABLET ORAL at 21:51

## 2025-03-06 RX ADMIN — Medication 5 L/MIN: at 19:05

## 2025-03-06 RX ADMIN — METOPROLOL TARTRATE 50 MG: 50 TABLET, FILM COATED ORAL at 21:49

## 2025-03-06 RX ADMIN — CARBIDOPA AND LEVODOPA 1 TABLET: 25; 100 TABLET ORAL at 21:49

## 2025-03-06 RX ADMIN — CEFTRIAXONE 2 G: 2 INJECTION, SOLUTION INTRAVENOUS at 17:40

## 2025-03-06 SDOH — ECONOMIC STABILITY: HOUSING INSECURITY: IN THE LAST 12 MONTHS, WAS THERE A TIME WHEN YOU WERE NOT ABLE TO PAY THE MORTGAGE OR RENT ON TIME?: NO

## 2025-03-06 SDOH — SOCIAL STABILITY: SOCIAL INSECURITY: HAVE YOU HAD ANY THOUGHTS OF HARMING ANYONE ELSE?: UNABLE TO ASSESS

## 2025-03-06 SDOH — ECONOMIC STABILITY: HOUSING INSECURITY: IN THE PAST 12 MONTHS, HOW MANY TIMES HAVE YOU MOVED WHERE YOU WERE LIVING?: 0

## 2025-03-06 SDOH — SOCIAL STABILITY: SOCIAL INSECURITY: DO YOU FEEL ANYONE HAS EXPLOITED OR TAKEN ADVANTAGE OF YOU FINANCIALLY OR OF YOUR PERSONAL PROPERTY?: UNABLE TO ASSESS

## 2025-03-06 SDOH — ECONOMIC STABILITY: INCOME INSECURITY: IN THE PAST 12 MONTHS HAS THE ELECTRIC, GAS, OIL, OR WATER COMPANY THREATENED TO SHUT OFF SERVICES IN YOUR HOME?: NO

## 2025-03-06 SDOH — SOCIAL STABILITY: SOCIAL INSECURITY
WITHIN THE LAST YEAR, HAVE YOU BEEN HUMILIATED OR EMOTIONALLY ABUSED IN OTHER WAYS BY YOUR PARTNER OR EX-PARTNER?: PATIENT UNABLE TO ANSWER

## 2025-03-06 SDOH — SOCIAL STABILITY: SOCIAL INSECURITY: DO YOU FEEL UNSAFE GOING BACK TO THE PLACE WHERE YOU ARE LIVING?: UNABLE TO ASSESS

## 2025-03-06 SDOH — HEALTH STABILITY: MENTAL HEALTH: HOW OFTEN DO YOU HAVE A DRINK CONTAINING ALCOHOL?: NEVER

## 2025-03-06 SDOH — ECONOMIC STABILITY: FOOD INSECURITY: WITHIN THE PAST 12 MONTHS, YOU WORRIED THAT YOUR FOOD WOULD RUN OUT BEFORE YOU GOT THE MONEY TO BUY MORE.: NEVER TRUE

## 2025-03-06 SDOH — SOCIAL STABILITY: SOCIAL INSECURITY: ABUSE: ADULT

## 2025-03-06 SDOH — SOCIAL STABILITY: SOCIAL INSECURITY: DOES ANYONE TRY TO KEEP YOU FROM HAVING/CONTACTING OTHER FRIENDS OR DOING THINGS OUTSIDE YOUR HOME?: UNABLE TO ASSESS

## 2025-03-06 SDOH — HEALTH STABILITY: MENTAL HEALTH: HOW OFTEN DO YOU HAVE SIX OR MORE DRINKS ON ONE OCCASION?: NEVER

## 2025-03-06 SDOH — ECONOMIC STABILITY: FOOD INSECURITY: WITHIN THE PAST 12 MONTHS, THE FOOD YOU BOUGHT JUST DIDN'T LAST AND YOU DIDN'T HAVE MONEY TO GET MORE.: NEVER TRUE

## 2025-03-06 SDOH — SOCIAL STABILITY: SOCIAL INSECURITY: WERE YOU ABLE TO COMPLETE ALL THE BEHAVIORAL HEALTH SCREENINGS?: YES

## 2025-03-06 SDOH — SOCIAL STABILITY: SOCIAL INSECURITY
WITHIN THE LAST YEAR, HAVE YOU BEEN KICKED, HIT, SLAPPED, OR OTHERWISE PHYSICALLY HURT BY YOUR PARTNER OR EX-PARTNER?: PATIENT UNABLE TO ANSWER

## 2025-03-06 SDOH — ECONOMIC STABILITY: HOUSING INSECURITY: AT ANY TIME IN THE PAST 12 MONTHS, WERE YOU HOMELESS OR LIVING IN A SHELTER (INCLUDING NOW)?: NO

## 2025-03-06 SDOH — SOCIAL STABILITY: SOCIAL INSECURITY: WITHIN THE LAST YEAR, HAVE YOU BEEN AFRAID OF YOUR PARTNER OR EX-PARTNER?: PATIENT UNABLE TO ANSWER

## 2025-03-06 SDOH — SOCIAL STABILITY: SOCIAL INSECURITY: HAS ANYONE EVER THREATENED TO HURT YOUR FAMILY OR YOUR PETS?: UNABLE TO ASSESS

## 2025-03-06 SDOH — ECONOMIC STABILITY: FOOD INSECURITY: HOW HARD IS IT FOR YOU TO PAY FOR THE VERY BASICS LIKE FOOD, HOUSING, MEDICAL CARE, AND HEATING?: NOT HARD AT ALL

## 2025-03-06 SDOH — SOCIAL STABILITY: SOCIAL INSECURITY
WITHIN THE LAST YEAR, HAVE YOU BEEN RAPED OR FORCED TO HAVE ANY KIND OF SEXUAL ACTIVITY BY YOUR PARTNER OR EX-PARTNER?: PATIENT UNABLE TO ANSWER

## 2025-03-06 SDOH — SOCIAL STABILITY: SOCIAL INSECURITY: HAVE YOU HAD THOUGHTS OF HARMING ANYONE ELSE?: UNABLE TO ASSESS

## 2025-03-06 SDOH — SOCIAL STABILITY: SOCIAL INSECURITY: ARE THERE ANY APPARENT SIGNS OF INJURIES/BEHAVIORS THAT COULD BE RELATED TO ABUSE/NEGLECT?: UNABLE TO ASSESS

## 2025-03-06 SDOH — SOCIAL STABILITY: SOCIAL INSECURITY: ARE YOU OR HAVE YOU BEEN THREATENED OR ABUSED PHYSICALLY, EMOTIONALLY, OR SEXUALLY BY ANYONE?: UNABLE TO ASSESS

## 2025-03-06 SDOH — HEALTH STABILITY: MENTAL HEALTH: HOW MANY DRINKS CONTAINING ALCOHOL DO YOU HAVE ON A TYPICAL DAY WHEN YOU ARE DRINKING?: PATIENT DOES NOT DRINK

## 2025-03-06 ASSESSMENT — LIFESTYLE VARIABLES
AUDIT-C TOTAL SCORE: 0
EVER HAD A DRINK FIRST THING IN THE MORNING TO STEADY YOUR NERVES TO GET RID OF A HANGOVER: NO
HAVE PEOPLE ANNOYED YOU BY CRITICIZING YOUR DRINKING: NO
EVER FELT BAD OR GUILTY ABOUT YOUR DRINKING: NO
SKIP TO QUESTIONS 9-10: 1
SKIP TO QUESTIONS 9-10: 1
HAVE YOU EVER FELT YOU SHOULD CUT DOWN ON YOUR DRINKING: NO
AUDIT-C TOTAL SCORE: 0
AUDIT-C TOTAL SCORE: 0
HOW MANY STANDARD DRINKS CONTAINING ALCOHOL DO YOU HAVE ON A TYPICAL DAY: PATIENT DOES NOT DRINK
HOW OFTEN DO YOU HAVE 6 OR MORE DRINKS ON ONE OCCASION: NEVER
TOTAL SCORE: 0
HOW OFTEN DO YOU HAVE A DRINK CONTAINING ALCOHOL: NEVER

## 2025-03-06 ASSESSMENT — COGNITIVE AND FUNCTIONAL STATUS - GENERAL
HELP NEEDED FOR BATHING: TOTAL
WALKING IN HOSPITAL ROOM: TOTAL
CLIMB 3 TO 5 STEPS WITH RAILING: TOTAL
TURNING FROM BACK TO SIDE WHILE IN FLAT BAD: TOTAL
MOVING TO AND FROM BED TO CHAIR: TOTAL
TOILETING: TOTAL
MOVING FROM LYING ON BACK TO SITTING ON SIDE OF FLAT BED WITH BEDRAILS: TOTAL
PERSONAL GROOMING: A LOT
DAILY ACTIVITIY SCORE: 8
MOBILITY SCORE: 6
DRESSING REGULAR UPPER BODY CLOTHING: TOTAL
DRESSING REGULAR LOWER BODY CLOTHING: TOTAL
STANDING UP FROM CHAIR USING ARMS: TOTAL
EATING MEALS: A LOT
PATIENT BASELINE BEDBOUND: YES

## 2025-03-06 ASSESSMENT — COLUMBIA-SUICIDE SEVERITY RATING SCALE - C-SSRS
6. HAVE YOU EVER DONE ANYTHING, STARTED TO DO ANYTHING, OR PREPARED TO DO ANYTHING TO END YOUR LIFE?: NO
1. IN THE PAST MONTH, HAVE YOU WISHED YOU WERE DEAD OR WISHED YOU COULD GO TO SLEEP AND NOT WAKE UP?: NO
2. HAVE YOU ACTUALLY HAD ANY THOUGHTS OF KILLING YOURSELF?: NO

## 2025-03-06 ASSESSMENT — PAIN - FUNCTIONAL ASSESSMENT: PAIN_FUNCTIONAL_ASSESSMENT: UNABLE TO SELF-REPORT

## 2025-03-06 ASSESSMENT — PATIENT HEALTH QUESTIONNAIRE - PHQ9
SUM OF ALL RESPONSES TO PHQ9 QUESTIONS 1 & 2: 0
1. LITTLE INTEREST OR PLEASURE IN DOING THINGS: NOT AT ALL
2. FEELING DOWN, DEPRESSED OR HOPELESS: NOT AT ALL

## 2025-03-06 ASSESSMENT — ACTIVITIES OF DAILY LIVING (ADL)
LACK_OF_TRANSPORTATION: NO
BATHING: DEPENDENT
HEARING - LEFT EAR: DIFFICULTY WITH NOISE
DRESSING YOURSELF: DEPENDENT
ADEQUATE_TO_COMPLETE_ADL: UNABLE TO ASSESS
TOILETING: DEPENDENT
JUDGMENT_ADEQUATE_SAFELY_COMPLETE_DAILY_ACTIVITIES: UNABLE TO ASSESS
LACK_OF_TRANSPORTATION: NO
GROOMING: DEPENDENT
FEEDING YOURSELF: DEPENDENT
WALKS IN HOME: DEPENDENT
PATIENT'S MEMORY ADEQUATE TO SAFELY COMPLETE DAILY ACTIVITIES?: UNABLE TO ASSESS
ASSISTIVE_DEVICE: EYEGLASSES
HEARING - RIGHT EAR: DIFFICULTY WITH NOISE

## 2025-03-06 ASSESSMENT — PAIN SCALES - GENERAL: PAINLEVEL_OUTOF10: 0 - NO PAIN

## 2025-03-06 NOTE — ED PROVIDER NOTES
The patient was seen by the midlevel/resident.  I have personally saw the patient and made/approved the management plan and take responsibility for the patient management.  I reviewed the EKG's (when done) and agree with the interpretation.  I have seen and examined the patient; agree with the workup, evaluation, MDM, and diagnosis.  The care plan has been discussed with the midlevel/resident; I have reviewed the note and agree with the documented findings.     Worked up for dyspnea.  Patient has his left lung filled with fluid on my exam.  He is also hypoxic and not normally on oxygen.  He is doing well with 5 L nasal cannula via his mouth.  He is stable at this time will be hospitalized and further worked up.  Talk to patient and family at bedside. Found to have a large pleural effusion and will be hospitalized.   Diagnoses as of 03/06/25 2123   Hypoxia   Pleural effusion, left     MD Anoop Hook MD  03/06/25 2123

## 2025-03-06 NOTE — ED PROVIDER NOTES
HPI   No chief complaint on file.      CC: Shortness of breath  HPI:   89-year-old male brought to ED via EMS for shortness of breath acute respiratory failure requiring oxygen supplementation.  Patient from home with history of Parkinson's, essentially nonverbal, he is essentially bedbound, normally not oxygen dependent, wife is primary historian, he does have a history of persistent A-fib on anticoagulation, non-insulin-dependent type 2 diabetes, no reported fevers, chills, no reported vomiting or diarrhea, wife reported oxygen saturation very low today 88% on room air according to family.    Additional Limitations to History:   External Records Reviewed: I reviewed recent and relevant outside records including   History Obtained From:     Past Medical History: Per HPI  Medications: Reviewed in EMR and with patient  Allergies:  Reviewed in EMR  Past Surgical History:   Social History:     ------------------------------------------------------------------------------------------------------  Physical Exam:  --Vital signs reviewed in nursing triage note, EMR flow sheets, and at patient's bedside  GEN:  alert, will open eyes to verbal command, grunting, according to wife this is his normal baseline mentation.  EYES: EOMI, non-injected sclera.  ENT: Moist mucous membranes, no apparent injuries or lesions.   CARDIO: Irregular irregular. No murmurs, rubs, or gallops.  2+ equal pulses of the distal extremities.  2+ pitting edema in the lower extremities bilaterally  PULM: Diffuse bilateral crackles   GI: Soft, non-tender, non-distended. No rebound tenderness or guarding.  SKIN: Warm and dry, no rashes or lesions.  MSK: ROM intact the extremities without contractures.   EXT: No peripheral edema, contusions, or wounds.     -------------------------------------------------------------------------------------------------------    Differential Diagnoses Considered:   Chronic Medical Conditions Significantly Affecting Care:    Diagnostic testing considered: [PERC, D-Dimer, PECARN, etc.]    - EKG interpreted by myself atrial fibrillation, ventricular rate 84 normal QRS duration of prolonged QT/QTc no obvious ST elevation, depression or acute ischemic findings.  - I independently interpreted: [CXR, CT, POCUS, etc. including your interpretation]  - Labs notable for     Escalation of Care: Appropriate for   Social Determinants of Health Significantly Affecting Care: [Homelessness, lacking transportation, uninsured, unable to afford medications]  Prescription Drug Consideration: [Antibiotics, antivirals, pain medications, etc.]  Discussion of Management with Other Providers:  I discussed the patient/results with: [admitting team, consultant, radiologist, social work, EPAT, case management, PT/OT, RT, PCP, etc.]      Raimundo Vilchis PA-C              Patient History   No past medical history on file.  Past Surgical History:   Procedure Laterality Date    OTHER SURGICAL HISTORY  12/30/2019    Cholecystectomy    OTHER SURGICAL HISTORY  12/30/2019    Back surgery    OTHER SURGICAL HISTORY  12/30/2019    Arterial stent placement     No family history on file.  Social History     Tobacco Use    Smoking status: Not on file    Smokeless tobacco: Not on file   Substance Use Topics    Alcohol use: Not on file    Drug use: Not on file       Physical Exam   ED Triage Vitals [03/06/25 1713]   Temperature Heart Rate Respirations BP   36.1 °C (97 °F) 84 (!) 23 123/80      Pulse Ox Temp src Heart Rate Source Patient Position   (!) 88 % -- -- --      BP Location FiO2 (%)     -- --       Physical Exam      ED Course & MDM   Diagnoses as of 03/07/25 1428   Hypoxia   Pleural effusion, left                 No data recorded                                 Medical Decision Making  89-year-old male with acute hypoxemic respiratory failure likely in the setting of pleural effusion and possibly underlying superimposed pneumonia patient is currently on 5 L via nasal  cannula he was started on azithromycin Rocephin, and was given a bolus of IV fluids when patient's became hypotensive blood pressure improved after bolus challenge and there is low suspicion for septic shock however pending blood cultures, negative influenza, COVID, low suspicion for urinary tract infection, does not appear to have any acute ischemic changes on ECG, mild leukocytosis at 12.2.  Placed inpatient for further IV antibiotics reassessment        Procedure  Critical Care    Performed by: Raimundo Vilchis PA-C  Authorized by: Anoop Reno MD    Critical care provider statement:     Critical care time (minutes):  120    Critical care start time:  3/6/2025 6:01 PM    Critical care end time:  3/6/2025 8:01 PM    Critical care time was exclusive of:  Separately billable procedures and treating other patients    Critical care was necessary to treat or prevent imminent or life-threatening deterioration of the following conditions:  Circulatory failure, sepsis, shock and respiratory failure    Critical care was time spent personally by me on the following activities:  Blood draw for specimens, development of treatment plan with patient or surrogate, examination of patient, evaluation of patient's response to treatment, ordering and performing treatments and interventions, ordering and review of laboratory studies, ordering and review of radiographic studies, pulse oximetry, re-evaluation of patient's condition and review of old charts    Care discussed with: admitting provider         Raimundo Vilchis PA-C  03/06/25 1723       Raimundo Vilchis PA-C  03/07/25 5732

## 2025-03-06 NOTE — ED TRIAGE NOTES
Pt POX 83% on Ra at home. Pt placed on a NRB and POX went up to 93%. Pt has a moist cough, unable to cough up sputum.

## 2025-03-07 ENCOUNTER — APPOINTMENT (OUTPATIENT)
Dept: RADIOLOGY | Facility: HOSPITAL | Age: 89
DRG: 177 | End: 2025-03-07
Payer: MEDICARE

## 2025-03-07 LAB
ALBUMIN SERPL BCP-MCNC: 3.1 G/DL (ref 3.4–5)
ANION GAP SERPL CALC-SCNC: 12 MMOL/L (ref 10–20)
BACTERIA UR CULT: NO GROWTH
BUN SERPL-MCNC: 32 MG/DL (ref 6–23)
CALCIUM SERPL-MCNC: 8.6 MG/DL (ref 8.6–10.3)
CHLORIDE SERPL-SCNC: 98 MMOL/L (ref 98–107)
CO2 SERPL-SCNC: 27 MMOL/L (ref 21–32)
CREAT SERPL-MCNC: 1.05 MG/DL (ref 0.5–1.3)
EGFRCR SERPLBLD CKD-EPI 2021: 68 ML/MIN/1.73M*2
ERYTHROCYTE [DISTWIDTH] IN BLOOD BY AUTOMATED COUNT: 16 % (ref 11.5–14.5)
EST. AVERAGE GLUCOSE BLD GHB EST-MCNC: 128 MG/DL
GLUCOSE BLD MANUAL STRIP-MCNC: 179 MG/DL (ref 74–99)
GLUCOSE SERPL-MCNC: 187 MG/DL (ref 74–99)
HBA1C MFR BLD: 6.1 %
HCT VFR BLD AUTO: 34.1 % (ref 41–52)
HGB BLD-MCNC: 11.9 G/DL (ref 13.5–17.5)
HOLD SPECIMEN: NORMAL
LACTATE SERPL-SCNC: 1.9 MMOL/L (ref 0.4–2)
LEGIONELLA AG UR QL: NEGATIVE
MAGNESIUM SERPL-MCNC: 1.71 MG/DL (ref 1.6–2.4)
MCH RBC QN AUTO: 31 PG (ref 26–34)
MCHC RBC AUTO-ENTMCNC: 34.9 G/DL (ref 32–36)
MCV RBC AUTO: 89 FL (ref 80–100)
NRBC BLD-RTO: 0 /100 WBCS (ref 0–0)
PHOSPHATE SERPL-MCNC: 3.1 MG/DL (ref 2.5–4.9)
PLATELET # BLD AUTO: 306 X10*3/UL (ref 150–450)
POTASSIUM SERPL-SCNC: 4.7 MMOL/L (ref 3.5–5.3)
PROCALCITONIN SERPL-MCNC: 0.23 NG/ML
RBC # BLD AUTO: 3.84 X10*6/UL (ref 4.5–5.9)
S PNEUM AG UR QL: NEGATIVE
SODIUM SERPL-SCNC: 132 MMOL/L (ref 136–145)
WBC # BLD AUTO: 11.3 X10*3/UL (ref 4.4–11.3)

## 2025-03-07 PROCEDURE — 2500000004 HC RX 250 GENERAL PHARMACY W/ HCPCS (ALT 636 FOR OP/ED)

## 2025-03-07 PROCEDURE — 2500000005 HC RX 250 GENERAL PHARMACY W/O HCPCS: Performed by: INTERNAL MEDICINE

## 2025-03-07 PROCEDURE — 99223 1ST HOSP IP/OBS HIGH 75: CPT | Performed by: BEHAVIOR TECHNICIAN

## 2025-03-07 PROCEDURE — 94668 MNPJ CHEST WALL SBSQ: CPT

## 2025-03-07 PROCEDURE — 2500000004 HC RX 250 GENERAL PHARMACY W/ HCPCS (ALT 636 FOR OP/ED): Performed by: BEHAVIOR TECHNICIAN

## 2025-03-07 PROCEDURE — 2500000002 HC RX 250 W HCPCS SELF ADMINISTERED DRUGS (ALT 637 FOR MEDICARE OP, ALT 636 FOR OP/ED): Performed by: INTERNAL MEDICINE

## 2025-03-07 PROCEDURE — 94664 DEMO&/EVAL PT USE INHALER: CPT

## 2025-03-07 PROCEDURE — 99223 1ST HOSP IP/OBS HIGH 75: CPT | Performed by: INTERNAL MEDICINE

## 2025-03-07 PROCEDURE — 74230 X-RAY XM SWLNG FUNCJ C+: CPT | Performed by: RADIOLOGY

## 2025-03-07 PROCEDURE — 87899 AGENT NOS ASSAY W/OPTIC: CPT | Mod: GEALAB | Performed by: BEHAVIOR TECHNICIAN

## 2025-03-07 PROCEDURE — 82947 ASSAY GLUCOSE BLOOD QUANT: CPT

## 2025-03-07 PROCEDURE — 31720 CLEARANCE OF AIRWAYS: CPT

## 2025-03-07 PROCEDURE — 92526 ORAL FUNCTION THERAPY: CPT | Mod: GN

## 2025-03-07 PROCEDURE — 83735 ASSAY OF MAGNESIUM: CPT | Performed by: BEHAVIOR TECHNICIAN

## 2025-03-07 PROCEDURE — 94760 N-INVAS EAR/PLS OXIMETRY 1: CPT

## 2025-03-07 PROCEDURE — 94667 MNPJ CHEST WALL 1ST: CPT

## 2025-03-07 PROCEDURE — 1200000002 HC GENERAL ROOM WITH TELEMETRY DAILY

## 2025-03-07 PROCEDURE — 85027 COMPLETE CBC AUTOMATED: CPT | Performed by: BEHAVIOR TECHNICIAN

## 2025-03-07 PROCEDURE — 92611 MOTION FLUOROSCOPY/SWALLOW: CPT | Mod: GN

## 2025-03-07 PROCEDURE — 2500000001 HC RX 250 WO HCPCS SELF ADMINISTERED DRUGS (ALT 637 FOR MEDICARE OP)

## 2025-03-07 PROCEDURE — 84145 PROCALCITONIN (PCT): CPT | Mod: GEALAB | Performed by: BEHAVIOR TECHNICIAN

## 2025-03-07 PROCEDURE — 36415 COLL VENOUS BLD VENIPUNCTURE: CPT | Performed by: BEHAVIOR TECHNICIAN

## 2025-03-07 PROCEDURE — 92610 EVALUATE SWALLOWING FUNCTION: CPT | Mod: GN

## 2025-03-07 PROCEDURE — 2500000001 HC RX 250 WO HCPCS SELF ADMINISTERED DRUGS (ALT 637 FOR MEDICARE OP): Performed by: BEHAVIOR TECHNICIAN

## 2025-03-07 PROCEDURE — 94669 MECHANICAL CHEST WALL OSCILL: CPT

## 2025-03-07 PROCEDURE — 87449 NOS EACH ORGANISM AG IA: CPT | Mod: GEALAB | Performed by: BEHAVIOR TECHNICIAN

## 2025-03-07 PROCEDURE — 94640 AIRWAY INHALATION TREATMENT: CPT

## 2025-03-07 PROCEDURE — 9420000001 HC RT PATIENT EDUCATION 5 MIN

## 2025-03-07 PROCEDURE — 74230 X-RAY XM SWLNG FUNCJ C+: CPT

## 2025-03-07 PROCEDURE — 80069 RENAL FUNCTION PANEL: CPT | Performed by: BEHAVIOR TECHNICIAN

## 2025-03-07 PROCEDURE — 2500000002 HC RX 250 W HCPCS SELF ADMINISTERED DRUGS (ALT 637 FOR MEDICARE OP, ALT 636 FOR OP/ED): Performed by: BEHAVIOR TECHNICIAN

## 2025-03-07 PROCEDURE — 2500000005 HC RX 250 GENERAL PHARMACY W/O HCPCS: Performed by: BEHAVIOR TECHNICIAN

## 2025-03-07 PROCEDURE — 83605 ASSAY OF LACTIC ACID: CPT | Performed by: BEHAVIOR TECHNICIAN

## 2025-03-07 RX ORDER — EAR PLUGS
1 EACH OTIC (EAR) 3 TIMES DAILY
Status: DISPENSED | OUTPATIENT
Start: 2025-03-07

## 2025-03-07 RX ORDER — LANOLIN ALCOHOL/MO/W.PET/CERES
400 CREAM (GRAM) TOPICAL DAILY
Status: DISPENSED | OUTPATIENT
Start: 2025-03-07

## 2025-03-07 RX ADMIN — METOPROLOL TARTRATE 50 MG: 50 TABLET, FILM COATED ORAL at 21:16

## 2025-03-07 RX ADMIN — BARIUM SULFATE 10 ML: 400 PASTE ORAL at 14:35

## 2025-03-07 RX ADMIN — CARBIDOPA AND LEVODOPA 1 TABLET: 25; 100 TABLET ORAL at 13:52

## 2025-03-07 RX ADMIN — Medication 400 MG: at 11:19

## 2025-03-07 RX ADMIN — POLYETHYLENE GLYCOL 3350 17 G: 17 POWDER, FOR SOLUTION ORAL at 08:18

## 2025-03-07 RX ADMIN — BARIUM SULFATE 5 ML: 400 SUSPENSION ORAL at 14:34

## 2025-03-07 RX ADMIN — ALBUTEROL SULFATE 3 ML: 2.5 SOLUTION RESPIRATORY (INHALATION) at 15:09

## 2025-03-07 RX ADMIN — BARIUM SULFATE 5 ML: 0.81 POWDER, FOR SUSPENSION ORAL at 14:35

## 2025-03-07 RX ADMIN — SENNOSIDES AND DOCUSATE SODIUM 2 TABLET: 50; 8.6 TABLET ORAL at 08:18

## 2025-03-07 RX ADMIN — Medication 15 L/MIN: at 15:52

## 2025-03-07 RX ADMIN — AZITHROMYCIN MONOHYDRATE 500 MG: 500 INJECTION, POWDER, LYOPHILIZED, FOR SOLUTION INTRAVENOUS at 18:29

## 2025-03-07 RX ADMIN — CARBIDOPA AND LEVODOPA 1 TABLET: 25; 100 TABLET ORAL at 11:19

## 2025-03-07 RX ADMIN — INSULIN LISPRO 1 UNITS: 100 INJECTION, SOLUTION INTRAVENOUS; SUBCUTANEOUS at 11:55

## 2025-03-07 RX ADMIN — Medication 1 APPLICATION: at 21:24

## 2025-03-07 RX ADMIN — CARBIDOPA AND LEVODOPA 1 TABLET: 25; 100 TABLET ORAL at 21:16

## 2025-03-07 RX ADMIN — Medication 5 L/MIN: at 20:39

## 2025-03-07 RX ADMIN — CARBIDOPA AND LEVODOPA 1 TABLET: 25; 100 TABLET ORAL at 16:19

## 2025-03-07 RX ADMIN — METOPROLOL TARTRATE 50 MG: 50 TABLET, FILM COATED ORAL at 08:17

## 2025-03-07 RX ADMIN — Medication 1 APPLICATION: at 16:19

## 2025-03-07 RX ADMIN — BARIUM SULFATE 15 ML: 400 SUSPENSION ORAL at 14:34

## 2025-03-07 RX ADMIN — ASPIRIN 81 MG: 81 TABLET, COATED ORAL at 08:17

## 2025-03-07 RX ADMIN — INSULIN LISPRO 1 UNITS: 100 INJECTION, SOLUTION INTRAVENOUS; SUBCUTANEOUS at 16:19

## 2025-03-07 RX ADMIN — SENNOSIDES AND DOCUSATE SODIUM 2 TABLET: 50; 8.6 TABLET ORAL at 21:16

## 2025-03-07 RX ADMIN — ALBUTEROL SULFATE 3 ML: 2.5 SOLUTION RESPIRATORY (INHALATION) at 20:34

## 2025-03-07 RX ADMIN — CARBIDOPA AND LEVODOPA 1 TABLET: 25; 100 TABLET ORAL at 18:29

## 2025-03-07 RX ADMIN — CEFTRIAXONE SODIUM 1 G: 1 INJECTION, SOLUTION INTRAVENOUS at 16:19

## 2025-03-07 RX ADMIN — CARBIDOPA AND LEVODOPA 1 TABLET: 25; 100 TABLET ORAL at 06:05

## 2025-03-07 ASSESSMENT — COGNITIVE AND FUNCTIONAL STATUS - GENERAL
STANDING UP FROM CHAIR USING ARMS: TOTAL
TURNING FROM BACK TO SIDE WHILE IN FLAT BAD: TOTAL
CLIMB 3 TO 5 STEPS WITH RAILING: TOTAL
HELP NEEDED FOR BATHING: TOTAL
MOBILITY SCORE: 6
DRESSING REGULAR UPPER BODY CLOTHING: TOTAL
DRESSING REGULAR UPPER BODY CLOTHING: TOTAL
STANDING UP FROM CHAIR USING ARMS: TOTAL
WALKING IN HOSPITAL ROOM: TOTAL
CLIMB 3 TO 5 STEPS WITH RAILING: TOTAL
DRESSING REGULAR LOWER BODY CLOTHING: TOTAL
DAILY ACTIVITIY SCORE: 8
MOVING TO AND FROM BED TO CHAIR: TOTAL
MOVING FROM LYING ON BACK TO SITTING ON SIDE OF FLAT BED WITH BEDRAILS: TOTAL
EATING MEALS: A LOT
EATING MEALS: A LOT
PERSONAL GROOMING: A LOT
PERSONAL GROOMING: A LOT
WALKING IN HOSPITAL ROOM: A LOT
TOILETING: TOTAL
DAILY ACTIVITIY SCORE: 8
MOVING TO AND FROM BED TO CHAIR: TOTAL
TURNING FROM BACK TO SIDE WHILE IN FLAT BAD: TOTAL
DRESSING REGULAR LOWER BODY CLOTHING: TOTAL
MOVING FROM LYING ON BACK TO SITTING ON SIDE OF FLAT BED WITH BEDRAILS: A LOT
HELP NEEDED FOR BATHING: TOTAL
TOILETING: TOTAL
MOBILITY SCORE: 8

## 2025-03-07 ASSESSMENT — PAIN SCALES - PAIN ASSESSMENT IN ADVANCED DEMENTIA (PAINAD)
BREATHING: NORMAL
BREATHING: SMILING OR INEXPRESSIVE
TOTALSCORE: NO NEED TO CONSOLE
TOTALSCORE: 0
BODYLANGUAGE: RELAXED

## 2025-03-07 ASSESSMENT — PAIN - FUNCTIONAL ASSESSMENT: PAIN_FUNCTIONAL_ASSESSMENT: PAINAD (PAIN ASSESSMENT IN ADVANCED DEMENTIA SCALE)

## 2025-03-07 ASSESSMENT — ACTIVITIES OF DAILY LIVING (ADL): LACK_OF_TRANSPORTATION: NO

## 2025-03-07 ASSESSMENT — PAIN SCALES - GENERAL: PAINLEVEL_OUTOF10: 0 - NO PAIN

## 2025-03-07 NOTE — CONSULTS
Nutrition Initial Assessment:   Nutrition Assessment    Reason for Assessment: Admission nursing screening (MST=2; Unintentional weight loss)    Patient is a 89 y.o. male presenting with hypoxia 2/2 suspected aspiration PNA. Antibiotic regimen started on admit.    Speech therapy consulted, pt tentatively planned for MBSS.    PMH: Severe Parkinson's w/ dementia, Persistent Afib on warfarin, T2DM w/o insulin, LE lymphedema, HTN, HLD, CAD s/p MI w/ PTCA/stent to proximal RCA (~2000s)     Nutrition History:  Food and Nutrient History: Visit made, pt off floor at that time. Spoke to wife who remained in the room. She reports pt has had a gradually declining appetite for awhile now. Notes significant decrease over the past few days. Pt typically has cereal, doughnut, or some scrambled eggs for breakfast. Receives Meals on Wheels for dinner that he usually has ~50% of. Does drink a supplement occasionally at home but not daily, wife unsure of brand name. Discussed options for thickened supplements like Magic Cup & Geltain which she is amenable to pt receiving this admission.  Vitamin/Herbal Supplement Use: None  Food Allergy:  (None)     Anthropometrics:  Height: 182.9 cm (6')   Weight: 110 kg (242 lb 8.1 oz)   BMI (Calculated): 32.88  IBW/kg (Dietitian Calculated): 80.9 kg  Percent of IBW: 136 %     Weight History:   [03/06/25] 110 kg --> Admit wt    No hx weights in EMR at time of assessment. Wife reports UBW of ~250 lbs but unsure when he last weighed that.     Weight Change %:  Weight History / % Weight Change: 3% x ~1 year  Significant Weight Loss: No    Nutrition Focused Physical Exam Findings:  Defer: Pt off floor for testing at time of visit     Edema:  Edema:  (Non-pitting BLE)    Physical Findings:  Skin: Positive (Sacral, L upper back, R upper back, Medial lower back, & L upper lateral face wounds)  Digestive System Findings: Constipation  Mouth Findings: Dysphagia    Nutrition Significant Labs:  BMP Trend:    Results from last 7 days   Lab Units 03/07/25  0621 03/06/25  1706   GLUCOSE mg/dL 187* 155*   CALCIUM mg/dL 8.6 8.8   SODIUM mmol/L 132* 133*   POTASSIUM mmol/L 4.7 5.3   CO2 mmol/L 27 26   CHLORIDE mmol/L 98 99   BUN mg/dL 32* 26*   CREATININE mg/dL 1.05 0.98      Nutrition Specific Medications:  Scheduled medications  azithromycin, 500 mg, intravenous, q24h  carbidopa-levodopa, 1 tablet, oral, 6x daily  cefTRIAXone, 1 g, intravenous, q24h  insulin lispro, 0-5 Units, subcutaneous, TID AC  magnesium oxide, 400 mg, oral, Daily  polyethylene glycol, 17 g, oral, Daily  sennosides-docusate sodium, 2 tablet, oral, BID  [Held by provider] warfarin, 5 mg, oral, Daily    I/O:   LBM: 3/7/25    Dietary Orders (From admission, onward)       Start     Ordered    03/06/25 2109  May Participate in Room Service With Assistance  ( ROOM SERVICE MAY PARTICIPATE WITH ASSISTANCE)  Once        Question:  .  Answer:  Yes    03/06/25 2108 03/06/25 2058  Adult diet Regular; Minced and moist 5; Moderately thick 3; Spoon feed only  Diet effective now        Question Answer Comment   Diet type Regular    Texture Minced and moist 5    Fluid consistency Moderately thick 3    Select tray type: Spoon feed only        03/06/25 2057             Estimated Needs:   Total Energy Estimated Needs in 24 hours (kCal):  (0285-8864)  Method for Estimating Needs: 25-27 kcals/kg x IBW  Total Protein Estimated Needs in 24 Hours (g):  (95+)  Method for Estimating 24 Hour Protein Needs: ~1.2 g/kg x IBW  Total Fluid Estimated Needs in 24 Hours (mL):  (6414-5485)  Method for Estimating 24 Hour Fluid Needs: 1mL/kcal or per team        Nutrition Diagnosis      Nutrition Diagnosis  Patient has Nutrition Diagnosis: Yes  Diagnosis Status (1): New  Nutrition Diagnosis 1: Swallowing difficulty  Related to (1): dysphagia  As Evidenced by (1): suspected aspiration PNA w/ need for modified diet       Nutrition Interventions/Recommendations      Nutrition  Recommendations:  Provide oral diet per speech therapy's recommendation    Check vitamin D level & replete PRN      Nutrition Interventions/Goals:   Interventions: Medical food supplement  Medical Food Supplement: Commercial food medical food supplement therapy  Goal: Magic Cup BID (290 kcals/9g protein each) + Gelatein MCT (260 kcals/20g protein each)      Nutrition Monitoring and Evaluation   Food/Nutrient Related History Monitoring  Monitoring and Evaluation Plan: Estimated Energy Intake  Estimated Energy Intake: Energy intake greater or equal to 75% of estimated energy needs      Time Spent (min): 60 minutes

## 2025-03-07 NOTE — H&P
"Patient: Reilly Feliz   Age: 89 y.o.   Gender: male   Room/Bed: 139/139-A     Attending: Parrish Alves DO  Code Status:  DNR and No Intubation and No ICU    Chief Complaint:  Patient: Reilly Feliz is a 89 y.o. male with PMHX severe Parkinson's with dementia, persistent Afib on warfarin, T2DM w/o insulin, lower extremity lymphedema HTN, HLD, and CAD s/p IMI w/ PTCA/stent to 70-80% proximal RCA (~s) who presented to the hospital for hypoxia.    HPI  Patient's wife was at bedside who provided supplemental history.    Per wife, patient has been bedridden at home.  He has a nurse who comes to the home every morning and another nurse who comes every Thursday afternoon.  Palliative visits every month as well.    During the nurse visit this afternoon, patient's SpO2 was noted to be low in the 80s, approximately 83%.  His hands were noted to be mildly colder than usual, and vitals were noted to be low with SBP <100.  His normal BP range is 110s/80s.  She does endorse him having increased dry cough and increased generalized weakness.  He is semireclined at home and he normally is able to feed himself here and there, but lately requires more help than usual.   At baseline, he can whisper some words at times, but appears to be having more difficulty recently.   He is usually responsive at home, with reported short-term memory loss. She denies recent illness or recent sick contact.     Of note, he has been constipated for x 1 week.  His wife would give him MiraLAX every other day or so.    12 point review of systems negative except those listed in the HPI.    ED COURSE:  BP 95/72   Pulse 69   Temp 36.1 °C (97 °F)   Resp 19   Ht 1.854 m (6' 1\")   Wt 109 kg (240 lb)   SpO2 94%   BMI 31.66 kg/m²     Imaging:  CXR: L sided pleural effusion and underlying infiltrate or atelectasis   CT chest/abdomen/pelvis:  EKG: Afib, rate controlled HR 84. No dynamic ST changes. Qtc 437 ms     Pertinent Labs:  CMP: B, " Na+: 133, K+: 5.3, BUN: 26, Cr: 0.98 (baseline: <1),   Ma.77  Troponin: 6     BNP: 110  Lactate: 2.5  CBC: WBC: 12.2, Hgb: 12.0, Hct: 38.0, Plt: 327  T-bili: elevated 1.6  PT/INR: 32.3/2.9      Micro:   Blood cx: Pending  UA: 1+ protein, +trace blood/ketones, 1+ urubilinogen, +25 leuks, +6-10 WBC/RBC, 1+ bacteruria, 3+ hyalin casts  , Urine cx: Pending  COVID: Negative  Flu A/B: Negative    Interventions:  - Ceftriaxone 2 g/ zithro   - 1L LR           SUBJECTIVE:    ALLERGIES PAST MEDICAL HISTORY PAST SURGICAL SURGERY FAMILY HISTORY SOCIAL HISTORY   No Known Allergies No past medical history on file. Past Surgical History:   Procedure Laterality Date    OTHER SURGICAL HISTORY  2019    Cholecystectomy    OTHER SURGICAL HISTORY  2019    Back surgery    OTHER SURGICAL HISTORY  2019    Arterial stent placement    No family history on file. Social History     Tobacco Use    Smoking status: Former     Types: Cigarettes    Smokeless tobacco: Never   Substance Use Topics    Alcohol use: Never    Drug use: Never              MEDS:      HOME MEDS SCHEDULED MEDS PRN IV MEDS   Current Outpatient Medications   Medication Instructions    carbidopa-levodopa (Sinemet)  mg tablet TAKE 1 TABLET BY MOUTH 6 TIMES A DAY    metFORMIN (GLUCOPHAGE) 500 mg, oral, 2 times daily    metoprolol tartrate (LOPRESSOR) 50 mg, oral, 2 times daily    nystatin (Mycostatin) cream Topical, 2 times daily    nystatin (Mycostatin) cream Topical, 2 times daily, to affected area    nystatin (Mycostatin) cream Topical, 2 times daily, to affected area    lactated Ringer's, 1,000 mL, intravenous, Once  oxygen, , inhalation, Continuous - Inhalation                      OBJECTIVE:  Physical Exam  Constitutional:       General: He is not in acute distress.     Appearance: He is ill-appearing.   Cardiovascular:      Rate and Rhythm: Normal rate and regular rhythm.      Pulses: Normal pulses.      Heart sounds: Normal heart sounds.    Pulmonary:      Breath sounds: Rhonchi and rales present.      Comments: 5L NC  Abdominal:      General: Bowel sounds are normal.      Palpations: Abdomen is soft.      Tenderness: There is no abdominal tenderness. There is no guarding or rebound.   Musculoskeletal:      Right lower leg: Edema present.      Left lower leg: Edema present.      Comments: +Trace b/l   Skin:     General: Skin is warm and dry.      Findings: Lesion and rash present.      Comments: L side of head scabbing, black lesion. 2-3cm in diameter.   Actinic keratosis around scalp    Neurological:      General: No focal deficit present.      Mental Status: He is alert and oriented to person, place, and time.   Psychiatric:         Mood and Affect: Mood normal.         Behavior: Behavior normal.                  VITALS:  Vitals:    03/06/25 1839 03/06/25 1844 03/06/25 1845 03/06/25 1901   BP: (!) 76/49 79/59 95/67 95/72   Pulse: 87 74 87 69   Resp: 18 20 18 19   Temp:       SpO2: 98% 94% 95% 94%   Weight:       Height:               Intake/Output Summary (Last 24 hours) at 3/6/2025 1932  Last data filed at 3/6/2025 1854  Gross per 24 hour   Intake 300 ml   Output --   Net 300 ml         LABS:     CBC:  Results from last 72 hours   Lab Units 03/06/25  1706   WBC AUTO x10*3/uL 12.2*   RBC AUTO x10*6/uL 4.12*   HEMOGLOBIN g/dL 12.0*   HEMATOCRIT % 38.0*   MCV fL 92   MCH pg 29.1   MCHC g/dL 31.6*   RDW % 16.1*   PLATELETS AUTO x10*3/uL 327     CMP:  Results from last 72 hours   Lab Units 03/06/25  1706   SODIUM mmol/L 133*   POTASSIUM mmol/L 5.3   CHLORIDE mmol/L 99   CO2 mmol/L 26   ANION GAP mmol/L 13   BUN mg/dL 26*   CREATININE mg/dL 0.98   EGFR mL/min/1.73m*2 74   GLUCOSE mg/dL 155*     Results from last 72 hours   Lab Units 03/06/25  1706   ALBUMIN g/dL 3.4   ALK PHOS U/L 73   ALT U/L <3*   AST U/L 6*   BILIRUBIN TOTAL mg/dL 1.6*     Calcium/Phos:   Results from last 72 hours   Lab Units 03/06/25  1706   CALCIUM mg/dL 8.8      COAG:   Results  "from last 72 hours   Lab Units 03/06/25  1743   INR  2.9*     CRP:   Lab Results   Component Value Date    CRP 9.98 (A) 12/09/2021       ENDO:  Recent Labs     01/07/20  1154 07/24/18  1159   TSH 0.78 1.36   HGBA1C  --  6.3*      CARDIAC:   Results from last 72 hours   Lab Units 03/06/25  1706   TROPHS ng/L 6   BNP pg/mL 110*       No data recorded    MICRO/ID:   No results found for the last 90 days.    No results found for: \"URINECULTURE\", \"BLOODCULT\", \"CSFCULTSMEAR\"        IMAGING:     XR chest 1 view    Result Date: 3/6/2025  Left-sided pleural effusion and underlying infiltrate or atelectasis. Signed by Raoul Lynn, DO             ASSESSMENT & PLAN  Reilly Feliz is a 89 y.o. 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    ACUTE MEDICAL ISSUES  # AHRF 2/2 likely aspiration PNA  #L pleural effusion  - Baseline: RA, breathing through the mouth. Initial: 5L NC  - Ceftriaxone 2 g/azithromycin in ED  - CXR: L sided pleural effusion  - Lactate: 2.5  Plan:  - Ceftriaxone 1 g (3/6 -), azithromycin 500 (3/6 - 3/8)  - Albuterol/DuoNeb nebulizers, IS and supportive care.  RT consulted  - Repeat lactate  - Procalcitonin, urine Legionella/strep, MRSA pending  - Blood and urine cx pending  - Palliative consulted   - SLP consulted for C/F aspiration  - Pulmonology consulted for thoracentesis    #Hypotension  -S/p 1L LR in ED  -Initial: 95/72 -> 112/92  Plan:  -  mL NS 0.9%    # Facial lesion C/F carcinoma  - 2-3 cm black, scabbing lesion reported to have grown per wife  - Previously put mupirocin on it which did not appear to help  Plan:  - Recommend outpatient dermatology  - Wound care consulted      # Persistent Afib, rate controlled  ::Follows cardiology, Dr. Baird  - Home metoprolol tartrate 50 mg BID and warfarin 5 mg " daily  - INR/PT: 2.9/32.3  Plan:  - Hold warfarin and continue metoprolol      # Mild, asymptomatic hyponatremia  -Initial: 133  Plan:  - IVF bolus  - CTM        CHRONIC MEDICAL ISSUES:  # Parkinson's: Home carbidopa-levodopa  mg 6x daily.  # HTN: Hold home ramipril 5 mg daily (substituted for lisinopril 10 mg)  #T2DM W/O insulin: Hold home metformin 500 mg BID and on SSI #1  #CAD s/p stent: Home ASA 81 mg daily        Fluids: PRN  Electrolytes: Replete PRN  Nutrition:Soft diet  GI Prophylaxis: None,   DVT Prophylaxis:  Warfarin (held) , Reason:  Afib, upcoming thora    Access: PIV  Antibiotics: Ceftriaxone 1 g (3/6 -), azithromycin 500 (3/6 - 3/8)  Oxygenation: 5L NC    Emergency Contact: Extended Emergency Contact Information  Primary Emergency Contact: Virgen Feliz  Home Phone: [phone]  Work Phone: [phone]  Mobile Phone: [phone]  Relation: Spouse   needed? No  Secondary Emergency Contact: Virgen Feliz  Home Phone: [phone]  Relation: None    Dispo: Home. Length of stay anticipated to be LOS: > 48 hours.        Radha Kidd,   Internal Medicine, PGY-1

## 2025-03-07 NOTE — CONSULTS
Wound Care Consult     Visit Date: 3/7/2025      Patient Name: Reilly Feliz         MRN: [id number]           YOB: 1935     Reason for Consult:    Wounds / skin changes       Wound History:   Present on admission     Pertinent Labs:   Albumin   Date Value Ref Range Status   03/07/2025 3.1 (L) 3.4 - 5.0 g/dL Final       Wound Assessment:  Wound 03/06/25 Sacrum (Active)   Wound Image   03/06/25 2115   Drainage Description Brown 03/06/25 2300   Drainage Amount Scant 03/06/25 2300   Dressing Foam 03/06/25 2300   Dressing Changed New 03/06/25 2300   Dressing Status Clean;Dry 03/07/25 0900       Wound 03/06/25 Back Left;Upper (Active)   Wound Image   03/06/25 2120   Dressing Gauze 03/06/25 2300   Dressing Status Clean;Dry 03/07/25 0900       Wound 03/06/25 Back Lateral;Right;Upper (Active)   Wound Image   03/06/25 2121   Dressing Gauze 03/06/25 2300   Dressing Changed New 03/06/25 2300   Dressing Status Clean;Dry 03/07/25 0900       Wound 03/06/25 Back Lower;Medial (Active)   Wound Image   03/06/25 2121   Dressing Open to air 03/06/25 2300   Dressing Status Clean;Dry 03/07/25 0900       Wound 03/06/25 Face Lateral;Left;Upper (Active)   Wound Image   03/06/25 2123   Dressing Open to air 03/06/25 2300   Dressing Status Clean;Dry 03/07/25 0900       Wound Team Summary Assessment: 88 y/o CM was admitted 3-6-25 with hypoxia, is seen with his wife who cares for him along with Home Health Care assist.  History includes Parkinson's and dementia, DMT2, BLE lymphedema.  EMR and images reviewed.  Reilly is seen to have diffusely dry skin with many small ecchymoses especially BUE.  There are also multiple small dry scabs scattered to BUE, scalp, back.       On the left side of face there is a 2.5 x 2.5 cm irregular area of completely dry scab which wife reports has been there x almost 2 years with basically little change.  She states sometimes the scab sheds, but always just builds back up again, never drains,  "never any SOI.  However they have not been able to get  to see a dermatologist as transporting him is a challenge.  He is wearing glasses which do not cause friction  nor pressure to the involved area.  As the area is so dry, stable, chronic recommend we simply leave it DESIREE and monitor.  His wife was refreshed on signs to observe for and report at home and stated she understood.    There is a small open shallow wound to left forearm 1 x 0.5 x 0.1 cm, scant s/s drainage   as well as a similar wound to right mid-back.  For these xeroform and [place] barrier dressings are recommended for moist healing.   Also - he needs moisturizer, Eucerin, orders obtained.    Skin to the buttocks has chronic lavender blanchable discoloration with a few scattered areas of epidermal erosion, the skin of lower back / L-S spine area has similar changes.  consistent with chronic MASD for which his wife applies a \"brown cream that's supposed to work really well\" which she does not know the name of.  Recommend Bordeaux Butt paste to protect / heal along with sacral Mepilex and frequent turns.   Wife is OK with this and an order was obtained.     Reilly does have TrueVue boots, one premier pro pad, 30 degree wedge, PureWick catheter and every 2 hour turns in place.     Wound Team Plan:   Facial wound DESIREE;  Xeroform and cover dressings left arm and back;  Butt paste to lower back and buttocks along with pressure relief interventions.     Soo Lee RN, WCC, DWC  3/7/2025  12:12 PM        "

## 2025-03-07 NOTE — PROGRESS NOTES
03/07/25 0847   Discharge Planning   Living Arrangements Spouse/significant other   Support Systems Spouse/significant other;Children   Assistance Needed A&Ox1-2 (person/place) Patient is dependent on assist for all ADLs and is a christian lift-not ambulating; room air baseline -currently 1L NC; PCP Dr Melton; (patient has private nurse in home and private aides 2-3hrs a day x 7days)   Type of Residence Private residence   Number of Stairs to Enter Residence 0   Number of Stairs Within Residence 0   Do you have animals or pets at home? No   Who is requesting discharge planning? Provider   Home or Post Acute Services In home services   Expected Discharge Disposition Home H  (Home with new  Home Health (patient has private pay nurse and aide in the home))   Does the patient need discharge transport arranged? Yes   RoundTrip coordination needed? Yes   Has discharge transport been arranged? No   Financial Resource Strain   How hard is it for you to pay for the very basics like food, housing, medical care, and heating? Not hard   Housing Stability   In the last 12 months, was there a time when you were not able to pay the mortgage or rent on time? N   In the past 12 months, how many times have you moved where you were living? 0   At any time in the past 12 months, were you homeless or living in a shelter (including now)? N   Transportation Needs   In the past 12 months, has lack of transportation kept you from medical appointments or from getting medications? no   In the past 12 months, has lack of transportation kept you from meetings, work, or from getting things needed for daily living? No

## 2025-03-07 NOTE — HOSPITAL COURSE
Reilly eFliz is a 89 y.o. 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     In the ED, patient was put on 5L NC. CXR showed L sided pleural effusion and underlying infiltrate or atelectasis. Elevated lactate 2.5. Concern for aspiration PNA vs CAP vs atelectasis. Patient was started on ceftriaxone and azithromycin and admitted to the floor.    On the floor, patient continued to be treated with abx. Also treated with supportive care. SLP consulted for C/F aspiration. Pulmonology consulted for possible thoracentesis, holding home warfarin until INR appropriate for procedure. Patient with clinical improvement and able to wean down supplemental oxygen. Patient refusing vital checks and meds on 3/9.

## 2025-03-07 NOTE — PROGRESS NOTES
Pharmacy Medication History Review    Reilly Feliz is a 89 y.o. male admitted for Hypoxia. Pharmacy reviewed the patient's [id number]-mi-ftunnfarl medications and allergies for accuracy.    The list below reflectives the updated PTA list. Please review each medication in order reconciliation for additional clarification and justification.  Prior to Admission Medications   Prescriptions Last Dose Informant Patient Reported? Taking?   aspirin 81 mg EC tablet 3/6/2025 Noon Spouse/Significant Other Yes Yes   Sig: Take 1 tablet (81 mg) by mouth once daily.   carbidopa-levodopa (Sinemet)  mg tablet 3/6/2025 at  4:00 PM Spouse/Significant Other Yes Yes   Sig: TAKE 1 TABLET BY MOUTH 6 TIMES A DAY   metFORMIN (Glucophage) 500 mg tablet 3/5/2025 Morning Spouse/Significant Other Yes Yes   Sig: TAKE 1 TABLET TWICE DAILY   metoprolol tartrate (Lopressor) 50 mg tablet 3/6/2025 Morning Spouse/Significant Other Yes Yes   Sig: TAKE 1 TABLET TWICE DAILY   nystatin (Mycostatin) cream Unknown Spouse/Significant Other Yes Yes   Sig: APPLY TO AFFECTED AREA TWICE A DAY   nystatin (Mycostatin) cream  Spouse/Significant Other No No   Sig: APPLY TO AFFECTED AREA TWICE A DAY   ramipril (Altace) 5 mg capsule 3/6/2025 Morning Spouse/Significant Other Yes Yes   Sig: Take 1 capsule (5 mg) by mouth once daily.   warfarin (Coumadin) 5 mg tablet 3/5/2025 Evening Spouse/Significant Other Yes Yes   Sig: Take 1 tablet (5 mg) by mouth.      Facility-Administered Medications: None           The list below reflectives the updated allergy list. Please review each documented allergy for additional clarification and justification.  Allergies  Reviewed by Nereyda Sampson RN on 3/6/2025   No Known Allergies         Below are additional concerns with the patient's PTA list.      Amy Cheng

## 2025-03-07 NOTE — PROGRESS NOTES
Subjective   Subjective:   History Of Present Illness:  Reilly Feliz is a 89 y.o. male was seen and evaluated at bedside today. Overnight, no reported acute events. Patient is at no acute distress.  Patient's son is at bedside, he is a paramedic.  The son thinks that the patient's lungs sound much better today.  He thinks that the patient is at his baseline mentation.  States that the patient has been bedbound for the past 2 years, and does have a sacral wound that he would like wound care for.  Denies any known aspiration for the patient, he eats soft foods and drinks through a straw at home.  On room air.       Objective   Objective:     /71 (BP Location: Right arm, Patient Position: Lying)   Pulse 77   Temp 36.4 °C (97.5 °F) (Temporal)   Resp 16   Ht 1.829 m (6')   Wt 110 kg (242 lb 8.1 oz)   SpO2 91%   BMI 32.89 kg/m²     Physical Exam  Constitutional:       General: He is not in acute distress.  Cardiovascular:      Rate and Rhythm: Normal rate.   Pulmonary:      Effort: Pulmonary effort is normal. No respiratory distress.      Breath sounds: No wheezing.   Musculoskeletal:      Comments: Trace b/l LE edema   Neurological:      Mental Status: He is alert.     Lab Work:     Lab Results   Component Value Date    WBC 11.3 03/07/2025    HGB 11.9 (L) 03/07/2025    HCT 34.1 (L) 03/07/2025    MCV 89 03/07/2025     03/07/2025     Lab Results   Component Value Date    GLUCOSE 187 (H) 03/07/2025    CALCIUM 8.6 03/07/2025     (L) 03/07/2025    K 4.7 03/07/2025    CO2 27 03/07/2025    CL 98 03/07/2025    BUN 32 (H) 03/07/2025    CREATININE 1.05 03/07/2025     Hemoglobin A1C   Date Value Ref Range Status   03/06/2025 6.1 (H) See comment % Final   07/24/2018 6.3 (H) 4.0 - 6.0 % Final     Comment:     Hemoglobin A1C levels are related to mean blood glucose during the   preceding 2-3 months. The relationship table below may be used as a   general guide. Each 1% increase in HGB A1C is a reflection  of an   increase in mean glucose of approximately 30 mg/dl.   Reference: Diabetes Care, volume 29, supplement 1 Jan. 2006                        HGB A1C ................. Approx. Mean Glucose   _______________________________________________   6%   ...............................  120 mg/dl   7%   ...............................  150 mg/dl   8%   ...............................  180 mg/dl   9%   ...............................  210 mg/dl   10%  ...............................  240 mg/dl  Performed at [place] [id number]       TSH   Date Value Ref Range Status   01/07/2020 0.78 0.44 - 3.98 mIU/L Final     Comment:      TSH testing is performed using different testing    methodology at [facility] than at other    [facility]. Direct result comparisons should    only be made within the same method.       Thyroid Stimulating Hormone   Date Value Ref Range Status   07/24/2018 1.36 0.27 - 4.20 MIU/L Final     Comment:     THIS TSH ASSAY HAS A FUNCTIONAL SENSITIVITY OF 0.01 MIU/L. THIS MEETS   REQUIREMENTS OF A 3RD GENERATION HIGH SENSITIVE TSH ASSAY.  Performed at [address] [address]       Vitamin D, 25-Hydroxy   Date Value Ref Range Status   01/07/2020 27 (A) ng/mL Final     Comment:     .  DEFICIENCY:         < 20  NG/ML  INSUFFICIENCY:      20-29 NG/ML  OPTIMUM LEVEL:      30-80 NG/ML  POSSIBLE TOXICITY:  > 80  NG/ML    THIS ASSAY ACCURATELY QUANTIFIES THE SUM OF  VITAMIN D3, 25-HYDROXY AND VIT D2,25-HYDROXY.       Cultures:   No results found for the last 90 days.    Images:     XR chest 1 view   Final Result   Left-sided pleural effusion and underlying infiltrate or atelectasis.   Signed by Raoul Lynn DO         Medications:     Scheduled:  albuterol, 3 mL, nebulization, TID  aspirin, 81 mg, oral, Daily  azithromycin, 500 mg, intravenous, q24h  carbidopa-levodopa, 1 tablet, oral, 6x daily  cefTRIAXone, 1 g, intravenous, q24h  insulin  lispro, 0-5 Units, subcutaneous, TID AC  [Held by provider] lisinopril, 10 mg, oral, Daily  metoprolol tartrate, 50 mg, oral, BID  oxygen, , inhalation, Continuous - Inhalation  polyethylene glycol, 17 g, oral, Daily  sennosides-docusate sodium, 2 tablet, oral, BID  [Held by provider] warfarin, 5 mg, oral, Daily    Continuous:     PRN:  PRN medications: albuterol, dextrose, dextrose, glucagon, glucagon, melatonin, ondansetron **OR** ondansetron     Assessment & Plan:     Reilly Feliz is a 89 y.o. 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 Admitted for AHRF 2/2 aspiration PNA vs pleural effusion.     ACUTE ISSUES:  #AHRF 2/2 aspiration PNA vs CAP vs atelectasis  #L pleural effusion  -On 1L NC, baseline RA  -CXR: L sided pleural effusion and underlying infiltrate or atelectasis   -Lactate 2.6 > 1.5  Plan:  -Continue ceftriaxone (3/6 -), azithromycin (3/6 - 3/8)  -Nebs/DuoNebs  -IS  -Procalcitonin, urine Legionella/strep, MRSA pending  -Blood and urine cx pending  -SLP consulted for C/F aspiration  -Pulmonology consulted for possible thoracentesis    #Severe Parkinson's:   Plan:  -Home carbidopa-levodopa  mg 6x daily  -Palliative consulted     #Hypotension- resolved  -S/p 1L LR, 500 mL NS  -Likely 2/2 dehydration  Plan:  -CTM     #Facial lesion   -2-3 cm black, scabbing lesion reported to have grown per wife  -Previously put mupirocin on it which did not appear to help  Plan:  -Recommend outpatient dermatology  -Wound care consulted    CHRONIC ISSUES:  #A Fib: HOLD home warfarin and continue metoprolol  #HTN: HOLD home ramipril 5 mg daily (substituted for lisinopril 10 mg)  #T2DM W/O insulin: Hold home metformin 500 mg BID and on SSI #1  #CAD s/p stent: Home ASA 81 mg daily    Fluid: Replete PRN  Electrolytes: Replete PRN  Nutrition: soft diet  GI PPx: none  DVT/PE PPx: warfarin HELD for possible  procedure  Abx: ceftriaxone, azithromycin  IV Lines: PIV  O2: RA    Disposition: 89M admitted for AHRF 2/2 aspiration PNA vs pleural effusion. Likely discharge over the weekend.    Aleida Kaur DO    Disclaimer: Documentation completed with the information available at the time of input. The times in the chart may not be reflective of actual patient care times, interventions, or procedures. Documentation occurs after the physical care of the patient.

## 2025-03-07 NOTE — PROGRESS NOTES
Speech-Language Pathology    Inpatient Modified Barium Swallow Study    Patient Name: Reilly Feliz  MRN: [id number]  : 1935  Today's Date: 25  Time Calculation  Start Time: 1415  Stop Time: 1440  Time Calculation (min): 25 min      Modified Barium Swallow Study completed. Informed verbal consent obtained from his wife prior to completion of exam. The study was completed per protocol with various liquid barium consistencies, and pudding. The patient was unable to tolerate repositioning to allow for AP view of the esophagus. Esophageal screening view was obtained in the lateral projection. A 1.9 cm or .75 inch (outer diameter) ring was placed on the chin in order to complete objective measurements during swallowing. The anatomic structures and function of the oropharynx, larynx, hypopharynx and cervical esophagus were evaluated.     SLP: GARRETT Marques   Contact info: [employer] secure chat      Reason for Referral: concern for dysphagia   Patient Hx: Reilly Feliz is a 89 y.o. 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   Respiratory Status: 6 L via NC  Current diet: Minced and moist diet/ IDDDSI level 5 diet, honey thick liquids    Pain:  Pain Scale: PAINAD  Rating: no pain    DIET RECOMMENDATIONS:     Per the results of today's MBSS, patient to take a diet of Minced and moist diet/ IDDDSI level 5   and IDDSI 3/honey thick liquids vs Non oral nutrition and hydration. Water between meals and after oral care per Kam Free Water Protocol. Risk for aspiration remains high even on  modified diet.     STRATEGIES:  Upright posture for po intake  Alternate liquids and solids  Encourage double swallows  Oral care after intake  Water between meals per Kam Free Water Protocol: Pt may have sips of thin  water under the following conditions only: no food present, within 2 hours after oral care, when fully alert and upright. All liquids besides water must be thickened.       SLP PLAN:  Skilled SLP Services: Skilled SLP intervention for dysphagia is warranted.  SLP Frequency: 1-2 follow up sessions.   Duration: current admit.   Treatment/Interventions:   - Compensatory strategy training  - Patient/caregiver education    Discussed POC: Patient  Discussed Risks/Benefits: Yes  Patient/Caregiver Agreeable: Yes    Short term goals established 03/07/25:   Pt will tolerate a pureed diet with IDDSI 3/honey thick liquids without dysphagia.     His wife will demonstrate risk for patient's aspiration and strategies to maximize functional safety independently.       Long term goals 03/07/25:   Patient will tolerate the least restrictive diet without overt difficulty or further pulmonary compromise by time of discharge.      Education Provided: Results and recommendations per MBSS, with video review; recommendations and POC at this time. Verbal understanding and agreement given on all accounts.     Treatment Provided Today: ST provided extensive education and training to pt/pt family regarding anatomy/physiology of swallow function, risk factors of aspiration/aspiration pna & how to mitigate factors, diet modifications, and the use of compensatory swallow strategies to promote pt safety upon PO intake including upright posture, alternating liquids and solids and ongoing significant risk for aspiration of all oral intake.    Additional Medical Consults Suggested:   - No new disciplines indicated    Repeat Study: Per MD or treating SLP       Mechanics of the Swallow Summary:  ORAL PHASE:  Lip Closure - No labial escape/anterior loss of bolus   Tongue Control During Bolus Hold - Posterior escape of less than half of the bolus   Bolus prep/mastication - Minimal mastication noted with majority of bolus left unchewed   Bolus  transport/lingual motion - Slowed Tongue Motion for A-P movement of the bolus   Oral residue - Residue collection on oral structure - on and under tongue    PHARYNGEAL PHASE:  Initiation of pharyngeal swallow - Bolus head at pit of pyriforms   Soft palate elevation - No bolus between soft palate/pharyngeal wall   Laryngeal elevation - Partial superior movement of thyroid cartilage and/or partial approximation of arytenoids to epiglottic petiole   Anterior hyoid excursion - Partial anterior movement   Epiglottic movement - Partial inversion  Laryngeal vestibule closure - Incomplete - narrow column of air/contrast in laryngeal vestibule   Pharyngeal stripping wave - Present, however, diminished   Pharyngeal contraction (A/P view) - Not tested       Pharyngoesophageal segment opening - Partial distension/partial duration with partial obstruction of flow of bolus   Tongue base retraction - Wide column of contrast or air between tongue base and pharyngeal wall   Pharyngeal residue - Collection of residue within or on the pharyngeal structures  - valleculae and pyriform sinuses.  Spilled into airway.   ESOPHAGEAL PHASE:  Esophageal clearance - Esophageal retention with retrograde flow below the pharyngoesophageal segment       SLP Impressions with Severity Rating:   Pt presents with severe oropharyngeal dysphagia upon completion of modified barium swallow study this date. Swallowing physiology is detailed above. Impairments most impacting swallowing safety and efficiency include poor airway protection with absent epiglottic deflection, weak laryngeal elevation and closure. Patient demonstrated aspiration after swallows of thin and nectar thick liquids and deep laryngeal penetration with honey consistency liquids/ significant risk for aspiration of same. Pharyngeal residue after all consistencies with significant risk for aspiration of same.     *Of note: The A-P bolus follow-through is not intended to be utilized as a  diagnostic assessment of the esophagus, rather a tool to observe the biomechanical aspects of the swallow continuum and to inform the need for further evaluation by medical specialists, as applicable.     Strategies attempted- Pt inconsistently re swallowed to clear residue upon request.       OUTCOME MEASURES:  Functional Oral Intake Scale  Functional Oral Intake Scale: Level 4        total oral diet of a single consistency       Rosenbek's Penetration Aspiration Scale  Thin Liquids: 8. SILENT ASPIRATION - contrast passes glottis, visible residue, NO pt response]   After the Swallow  Nectar Thick Liquids: 8. SILENT ASPIRATION - contrast passes glottis, visible residue, NO pt response]   After the Swallow  Honey Thick Liquids: 5. DEEP PENETRATION with HIGH ASPIRATION risk - contrast contacts vocal cords, visible residue  Before the Swallow  After the Swallow  Puree: 2. PENETRATION that CLEARS - contrast enter airway, above vocal cords, no residue

## 2025-03-07 NOTE — CONSULTS
Inpatient consult to Pulmonology  Consult performed by: Mathew Mari MD  Consult ordered by: Parrish Alves DO        Department of Medicine  Division of Pulmonary, Critical Care, and Sleep Medicine      History Of Present Illness  Reilly Feliz is a 89 y.o. male with history of Parkinson's, dementia, A-fib on Coumadin, diabetes, CAD admitted with acute hypoxic respiratory failure.  Patient is not oriented/poor historian, said that he is not in trouble.  History obtained from son at bedside and medical records, patient brought to the ED due to hypoxia and soft blood pressure found by visiting nurse, family denied any fever, does have chronic lower extremity edema but seems to be at baseline.  His mental status improved and at baseline now per son.         No past medical history on file.    Past Surgical History:   Procedure Laterality Date    OTHER SURGICAL HISTORY  12/30/2019    Cholecystectomy    OTHER SURGICAL HISTORY  12/30/2019    Back surgery    OTHER SURGICAL HISTORY  12/30/2019    Arterial stent placement        Social History     Tobacco Use    Smoking status: Former     Types: Cigarettes    Smokeless tobacco: Never   Substance Use Topics    Alcohol use: Never    Drug use: Never       No family history on file.        Allergies  Patient has no known allergies.    Review of Systems  Unable to obtain due to dementia.      Physical exam  Constitutional: Awake, comfortable in bed, in no distress.  HEENT: Normocephalic and atraumatic.  Cardiovascular: Normal rate and regular rhythm.  Pulmonary: Coarse breath sounds bilaterally, no wheezing.  Musculoskeletal: No edema, no cyanosis.  Neurological: Awake, answering simple questions, not oriented  Psychiatric: Pleasantly confused     Vital Signs  Visit Vitals  /71 (BP Location: Right arm, Patient Position: Lying)   Pulse 77   Temp 36.4 °C (97.5 °F) (Temporal)   Resp 16   Ht 1.829 m (6')   Wt 110 kg (242 lb 8.1 oz)   SpO2 91%   BMI 32.89 kg/m²    Smoking Status Former   BSA 2.36 m²      Lab Results   Component Value Date    WBC 11.3 03/07/2025    HGB 11.9 (L) 03/07/2025    HCT 34.1 (L) 03/07/2025    MCV 89 03/07/2025     03/07/2025      Lab Results   Component Value Date    GLUCOSE 187 (H) 03/07/2025    CALCIUM 8.6 03/07/2025     (L) 03/07/2025    K 4.7 03/07/2025    CO2 27 03/07/2025    CL 98 03/07/2025    BUN 32 (H) 03/07/2025    CREATININE 1.05 03/07/2025      Lab Results   Component Value Date    ALT <3 (L) 03/06/2025    AST 6 (L) 03/06/2025    ALKPHOS 73 03/06/2025    BILITOT 1.6 (H) 03/06/2025        Oxygen Therapy  SpO2: 91 %  Medical Gas Therapy: Supplemental oxygen  Medical Gas Delivery Method: Nasal cannula       Medications   Scheduled medications  albuterol, 3 mL, nebulization, TID  aspirin, 81 mg, oral, Daily  azithromycin, 500 mg, intravenous, q24h  carbidopa-levodopa, 1 tablet, oral, 6x daily  cefTRIAXone, 1 g, intravenous, q24h  insulin lispro, 0-5 Units, subcutaneous, TID AC  [Held by provider] lisinopril, 10 mg, oral, Daily  metoprolol tartrate, 50 mg, oral, BID  oxygen, , inhalation, Continuous - Inhalation  polyethylene glycol, 17 g, oral, Daily  sennosides-docusate sodium, 2 tablet, oral, BID  [Held by provider] warfarin, 5 mg, oral, Daily      Continuous medications     PRN medications  PRN medications: albuterol, dextrose, dextrose, glucagon, glucagon, melatonin, ondansetron **OR** ondansetron     Chest Radiograph   No results found for this or any previous visit from the past 10 days.      XR chest 1 view 03/06/2025    Narrative  STUDY:  Chest Radiograph;  03/06/2025 at 17:17 hours.  INDICATION:  Shortness of breath.  COMPARISON:  None Available  ACCESSION NUMBER(S):  [id number]  ORDERING CLINICIAN:  JOSÉ MANUEL JEAN-BAPTISTE  TECHNIQUE:  Frontal chest was obtained at 17:17 hours.  FINDINGS:  CARDIOMEDIASTINAL SILHOUETTE:  Cardiomediastinal silhouette is enlarged.  LUNGS:  Lungs reveals opacification left lower lobe felt  "represent combination  of pleural fluid and underlying infiltrate or atelectasis.  ABDOMEN:  No remarkable upper abdominal findings.    BONES:  No acute osseous changes.    Impression  Left-sided pleural effusion and underlying infiltrate or atelectasis.  Signed by Raoul Lynn, DO         Pulmonary Function Tests   Pulmonary Functions Testing Results:  No results found for: \"FEV1\", \"FVC\", \"CZP2BZL\", \"TLC\", \"DLCO\"         Assessment and Plan / Recommendations   Assessment/Plan   89 y.o. male with history of Parkinson's, dementia, A-fib on Coumadin, diabetes, CAD admitted with acute hypoxic respiratory failure    1.  Acute hypoxic respiratory failure due to pneumonia  2.  Community-acquired pneumonia, rule out aspiration with advanced Parkinson  3.  Moderate left pleural effusion on bedside ultrasound today    -Continue ceftriaxone and azithromycin  -Speech consult to rule out aspiration  -Attempt/encourage incentive spirometry, Acapella, nebs as needed, wean O2 as tolerated  -Keep Coumadin on hold for thoracentesis when INR<1.8       Mathew Mari MD    "

## 2025-03-07 NOTE — CARE PLAN
NT suction pt d\t congestion. . RT passed the catheter twice. During the second pass pt dropped to 74% sats HR 44. Placed pt on NRB pt recovered after 2 min.Increased pt to 5L pt sating 93% Pt stable

## 2025-03-07 NOTE — PROGRESS NOTES
Speech-Language Pathology    Speech-Language Pathology Clinical Swallow Evaluation    Patient Name: Reilly Feliz  MRN: [id number]  : 1935  Today's Date: 25  Start Time: 1225  Stop Time: 1249  Time Calculation (min): 24 min      ASSESSMENT  Impressions:  Marked oral phase and suspected pharyngeal phase dysphagia based on clinical swallow evaluation. Pt would benefit from skilled ST to minimize aspiration risk and ensure ongoing safety with the least restrictive diet.  Prognosis: Fair    PLAN    RECOMMENDATIONS:  Is MBSS recommended? Yes; MBSS is recommended in order to objectively assess for aspiration risk, safest/least restrictive diet, and any effective compensatory strategies.  Solid consistency: Pureed until MBS  Liquid consistency: Moderately thick (honey thick) until MBS  Medication administration: Crushed in puree    Compensatory swallow strategies:  - Upright positioning for all PO intake  - Remain upright for >30 min after meals  - Slow rate of intake  - Small bites    Recommended frequency/duration:  Skilled SLP services recommended: Yes  Frequency: 2x/week  Duration: Current admission  Discharge recommendation: Unable to determine at this time; please see follow-up notes for DC recommendation.  Treatment/Interventions: Assess diet tolerance  Strengths: Family/caregiver support  Barriers to participation in tx: Cognition, Age, and Baseline impaired functional status    Goals (start date 3/7/2025):  - Pt will consume prescribed diet (current diet is pureed, honey) without overt s/sx aspiration/penetration in 95% of observed trials.  Status: Goal initiated this date   Progress this date: n/a    - Pt will participate in MBSS to determine readiness for a PO diet.   Status: Goal initiated this date   Progress this date: n/a      SUBJECTIVE    PMHx relevant to rehab:   Reilly Feliz is a 89 y.o. 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     Chief complaint: Pt was admitted on 3/6/25 due to   Chief Complaint   Patient presents with    Shortness of Breath     Pt POX 83% on Ra at home. Pt placed on a NRB and POX went up to 93%. Pt has a moist cough, unable to cough up sputum.   . He was found to have Hypoxia.    Relevant imaging results:  Chest x ray 3/6/25  IMPRESSION:  Left-sided pleural effusion and underlying infiltrate or atelectasis.      General Visit Information:  SLP Received On: 03/07/25  Patient Class: Inpatient  Living Environment: Home  Ordering Physician: Radha Kidd DO  Reason for Referral: Likely aspiration pneumonia  Prior to Session Communication: Bedside nurse    RN cleared pt to participate in session and reported that his respirations are very wet. Poor oral intake.     Pt did not add meaningful information to history gathering. His wife was alert, pleasant and appropriate. She reported that she is having increasing difficulty finding foods that the patient is able to eat. Additionally, she reports that he coughs after water when taking his pills.      [employer] Diet: soft thin  Current Diet : Pureed, honey    Status at time of evaluation:  Pain Assessment  Pain Assessment: PAINAD (Pain Assessment in Advanced Dementia Scale)  0-10 (Numeric) Pain Score: 0 - No pain  Unable to Self-Report Pain Reason: Nonverbal    Pt was alert and cooperative for session.  Orientation: Did not correctly report any orientation information.    Ability to follow functional commands: Follows simple commands  Nutritional status: Appears well-nourished/no concerns    Respiratory status: Supplemental oxygen via NC  Baseline Vocal Quality: Wet, Weak  Volitional Cough: Weak, Wet  Volitional Swallow: Absent  Patient positioning: Pt refused fully upright positioning; partially reclined      OBJECTIVE  Clinical swallow  evaluation completed and consisted of interview (family assisted), limited oral motor assessment, and limited PO trials (tsp applesauce).    ORAL PHASE: Natural dentition in god condition. Oral mucosa were pink, moist, and free of obvious lesions. Lingual strength and ROM were diminished. Labial strength/ROM were diminished. Labial seal was adequate. Mastication of regular solids was not tested.  A/P transit and oral clearance were slow.    PHARYNGEAL PHASE: Laryngeal elevation was visualized or palpated with all trials, however adequacy of hyolaryngeal elevation/excursion cannot be determined at bedside. Cough after intake. Wet respirations.     Was 3oz challenge administered: No, deferred due to safety concerns.      Treatment/Education:  Results and recommendations were relayed to: Patient and Family and doctor  Education provided: Yes   Learner: Significant other   Barriers to learning: None   Method of teaching: Verbal and Demonstration   Topic: role of ST, results of assessment, risk for aspiration, recommendation for MBSS, recommended safe swallow strategies, swallow anatomy/physiology, and recommendation for dysphagia follow-up   Outcome of teaching: Pt/family demonstrated good understanding  Treatment provided: No

## 2025-03-08 LAB
ALBUMIN SERPL BCP-MCNC: 2.8 G/DL (ref 3.4–5)
ANION GAP SERPL CALC-SCNC: 14 MMOL/L (ref 10–20)
BUN SERPL-MCNC: 29 MG/DL (ref 6–23)
CALCIUM SERPL-MCNC: 8.5 MG/DL (ref 8.6–10.3)
CHLORIDE SERPL-SCNC: 99 MMOL/L (ref 98–107)
CO2 SERPL-SCNC: 28 MMOL/L (ref 21–32)
CREAT SERPL-MCNC: 0.9 MG/DL (ref 0.5–1.3)
EGFRCR SERPLBLD CKD-EPI 2021: 82 ML/MIN/1.73M*2
ERYTHROCYTE [DISTWIDTH] IN BLOOD BY AUTOMATED COUNT: 16 % (ref 11.5–14.5)
GLUCOSE BLD MANUAL STRIP-MCNC: 101 MG/DL (ref 74–99)
GLUCOSE BLD MANUAL STRIP-MCNC: 76 MG/DL (ref 74–99)
GLUCOSE SERPL-MCNC: 109 MG/DL (ref 74–99)
HCT VFR BLD AUTO: 31.9 % (ref 41–52)
HGB BLD-MCNC: 10.2 G/DL (ref 13.5–17.5)
INR PPP: 3.9 (ref 0.9–1.1)
MAGNESIUM SERPL-MCNC: 1.8 MG/DL (ref 1.6–2.4)
MCH RBC QN AUTO: 29.4 PG (ref 26–34)
MCHC RBC AUTO-ENTMCNC: 32 G/DL (ref 32–36)
MCV RBC AUTO: 92 FL (ref 80–100)
NRBC BLD-RTO: 0 /100 WBCS (ref 0–0)
PHOSPHATE SERPL-MCNC: 2.6 MG/DL (ref 2.5–4.9)
PLATELET # BLD AUTO: 271 X10*3/UL (ref 150–450)
POTASSIUM SERPL-SCNC: 4.5 MMOL/L (ref 3.5–5.3)
PROTHROMBIN TIME: 43.5 SECONDS (ref 9.8–12.4)
Q ONSET: 219 MS
QRS COUNT: 14 BEATS
QRS DURATION: 86 MS
QT INTERVAL: 370 MS
QTC CALCULATION(BAZETT): 437 MS
QTC FREDERICIA: 413 MS
R AXIS: 58 DEGREES
RBC # BLD AUTO: 3.47 X10*6/UL (ref 4.5–5.9)
SODIUM SERPL-SCNC: 136 MMOL/L (ref 136–145)
T AXIS: 24 DEGREES
T OFFSET: 404 MS
VENTRICULAR RATE: 84 BPM
WBC # BLD AUTO: 12.7 X10*3/UL (ref 4.4–11.3)

## 2025-03-08 PROCEDURE — 80069 RENAL FUNCTION PANEL: CPT | Performed by: BEHAVIOR TECHNICIAN

## 2025-03-08 PROCEDURE — 2500000004 HC RX 250 GENERAL PHARMACY W/ HCPCS (ALT 636 FOR OP/ED): Performed by: BEHAVIOR TECHNICIAN

## 2025-03-08 PROCEDURE — 82947 ASSAY GLUCOSE BLOOD QUANT: CPT

## 2025-03-08 PROCEDURE — 1200000002 HC GENERAL ROOM WITH TELEMETRY DAILY

## 2025-03-08 PROCEDURE — 83735 ASSAY OF MAGNESIUM: CPT | Performed by: BEHAVIOR TECHNICIAN

## 2025-03-08 PROCEDURE — 2500000005 HC RX 250 GENERAL PHARMACY W/O HCPCS: Performed by: BEHAVIOR TECHNICIAN

## 2025-03-08 PROCEDURE — 99233 SBSQ HOSP IP/OBS HIGH 50: CPT | Performed by: INTERNAL MEDICINE

## 2025-03-08 PROCEDURE — 2500000002 HC RX 250 W HCPCS SELF ADMINISTERED DRUGS (ALT 637 FOR MEDICARE OP, ALT 636 FOR OP/ED): Performed by: INTERNAL MEDICINE

## 2025-03-08 PROCEDURE — 99233 SBSQ HOSP IP/OBS HIGH 50: CPT

## 2025-03-08 PROCEDURE — 85027 COMPLETE CBC AUTOMATED: CPT | Performed by: BEHAVIOR TECHNICIAN

## 2025-03-08 PROCEDURE — 2500000001 HC RX 250 WO HCPCS SELF ADMINISTERED DRUGS (ALT 637 FOR MEDICARE OP): Performed by: BEHAVIOR TECHNICIAN

## 2025-03-08 PROCEDURE — 2500000001 HC RX 250 WO HCPCS SELF ADMINISTERED DRUGS (ALT 637 FOR MEDICARE OP)

## 2025-03-08 PROCEDURE — 94760 N-INVAS EAR/PLS OXIMETRY 1: CPT

## 2025-03-08 PROCEDURE — 85610 PROTHROMBIN TIME: CPT

## 2025-03-08 PROCEDURE — 94669 MECHANICAL CHEST WALL OSCILL: CPT

## 2025-03-08 PROCEDURE — 94640 AIRWAY INHALATION TREATMENT: CPT

## 2025-03-08 PROCEDURE — 36415 COLL VENOUS BLD VENIPUNCTURE: CPT | Performed by: BEHAVIOR TECHNICIAN

## 2025-03-08 RX ADMIN — CARBIDOPA AND LEVODOPA 1 TABLET: 25; 100 TABLET ORAL at 13:03

## 2025-03-08 RX ADMIN — SKIN PROTECTANT 1 APPLICATION: 33 OINTMENT TOPICAL at 21:24

## 2025-03-08 RX ADMIN — CEFTRIAXONE SODIUM 1 G: 1 INJECTION, SOLUTION INTRAVENOUS at 16:05

## 2025-03-08 RX ADMIN — AZITHROMYCIN MONOHYDRATE 500 MG: 500 INJECTION, POWDER, LYOPHILIZED, FOR SOLUTION INTRAVENOUS at 17:31

## 2025-03-08 RX ADMIN — CARBIDOPA AND LEVODOPA 1 TABLET: 25; 100 TABLET ORAL at 21:18

## 2025-03-08 RX ADMIN — Medication 1 L/MIN: at 20:00

## 2025-03-08 RX ADMIN — Medication 1 APPLICATION: at 14:55

## 2025-03-08 RX ADMIN — CARBIDOPA AND LEVODOPA 1 TABLET: 25; 100 TABLET ORAL at 06:24

## 2025-03-08 RX ADMIN — ALBUTEROL SULFATE 3 ML: 2.5 SOLUTION RESPIRATORY (INHALATION) at 19:20

## 2025-03-08 RX ADMIN — Medication 4 L/MIN: at 07:31

## 2025-03-08 RX ADMIN — SKIN PROTECTANT 1 APPLICATION: 33 OINTMENT TOPICAL at 09:00

## 2025-03-08 RX ADMIN — Medication 1 APPLICATION: at 09:00

## 2025-03-08 RX ADMIN — CARBIDOPA AND LEVODOPA 1 TABLET: 25; 100 TABLET ORAL at 16:05

## 2025-03-08 RX ADMIN — Medication 1 APPLICATION: at 21:20

## 2025-03-08 RX ADMIN — ALBUTEROL SULFATE 3 ML: 2.5 SOLUTION RESPIRATORY (INHALATION) at 07:30

## 2025-03-08 ASSESSMENT — COGNITIVE AND FUNCTIONAL STATUS - GENERAL
EATING MEALS: TOTAL
WALKING IN HOSPITAL ROOM: TOTAL
CLIMB 3 TO 5 STEPS WITH RAILING: TOTAL
DRESSING REGULAR LOWER BODY CLOTHING: TOTAL
TOILETING: TOTAL
DAILY ACTIVITIY SCORE: 6
MOVING TO AND FROM BED TO CHAIR: TOTAL
MOVING FROM LYING ON BACK TO SITTING ON SIDE OF FLAT BED WITH BEDRAILS: TOTAL
STANDING UP FROM CHAIR USING ARMS: TOTAL
DRESSING REGULAR UPPER BODY CLOTHING: TOTAL
HELP NEEDED FOR BATHING: TOTAL
TURNING FROM BACK TO SIDE WHILE IN FLAT BAD: TOTAL
MOBILITY SCORE: 6
PERSONAL GROOMING: TOTAL

## 2025-03-08 ASSESSMENT — PAIN SCALES - WONG BAKER: WONGBAKER_NUMERICALRESPONSE: NO HURT

## 2025-03-08 ASSESSMENT — PAIN SCALES - GENERAL: PAINLEVEL_OUTOF10: 0 - NO PAIN

## 2025-03-08 NOTE — PROGRESS NOTES
Department of Medicine  Division of Pulmonary, Critical Care, and Sleep Medicine    Reilly Feliz is a 89 y.o. male on day 2 of admission presenting with Hypoxia.    Subjective   Stable, no events.       Objective     Vital Signs      3/7/2025     8:11 AM 3/7/2025    12:51 PM 3/7/2025     4:10 PM 3/7/2025     8:26 PM 3/8/2025    12:20 AM 3/8/2025     5:27 AM 3/8/2025     7:46 AM   Vitals   Systolic 119 116 132 105  127 128   Diastolic 71 66 86 71  80 71   BP Location Right arm Right arm Right arm Right arm Right arm Right arm Right arm   Heart Rate 77 70 104 82  80 85   Temp 36.4 °C (97.5 °F) 36.5 °C (97.7 °F) 36.5 °C (97.7 °F) 36.7 °C (98.1 °F)  36.5 °C (97.7 °F) 36.5 °C (97.7 °F)   Resp 16 20 20 18 18 18 19        Physical exam  Constitutional: Awake, comfortable in bed, in no distress.  HEENT: Normocephalic and atraumatic.  Cardiovascular: Normal rate and regular rhythm.  Pulmonary: Coarse breath sounds bilaterally, no wheezing.  Musculoskeletal: No edema, no cyanosis.  Neurological: Awake, answering simple questions, not oriented  Psychiatric: Pleasantly confused    Labs:  Lab Results   Component Value Date    WBC 12.7 (H) 03/08/2025    HGB 10.2 (L) 03/08/2025    HCT 31.9 (L) 03/08/2025    MCV 92 03/08/2025     03/08/2025      Lab Results   Component Value Date    GLUCOSE 109 (H) 03/08/2025    CALCIUM 8.5 (L) 03/08/2025     03/08/2025    K 4.5 03/08/2025    CO2 28 03/08/2025    CL 99 03/08/2025    BUN 29 (H) 03/08/2025    CREATININE 0.90 03/08/2025      Lab Results   Component Value Date    ALT <3 (L) 03/06/2025    AST 6 (L) 03/06/2025    ALKPHOS 73 03/06/2025    BILITOT 1.6 (H) 03/06/2025        Oxygen Therapy  SpO2: 99 %  Medical Gas Therapy: Supplemental oxygen  Medical Gas Delivery Method: Nasal cannula  FiO2 (%):  [28 %-100 %] 36 %    Intake/Output last 3 Shifts:  I/O last 3 completed shifts:  In: 2029.2 (18.4 mL/kg) [P.O.:300; IV Piggyback:1729.2]  Out: 700 (6.4 mL/kg) [Urine:700 (0.2  mL/kg/hr)]  Weight: 110 kg       Medications   Scheduled medications  albuterol, 3 mL, nebulization, TID  aspirin, 81 mg, oral, Daily  azithromycin, 500 mg, intravenous, q24h  carbidopa-levodopa, 1 tablet, oral, 6x daily  cefTRIAXone, 1 g, intravenous, q24h  eucerin, 1 Application, Topical, BID  insulin lispro, 0-5 Units, subcutaneous, TID AC  [Held by provider] lisinopril, 10 mg, oral, Daily  magnesium oxide, 400 mg, oral, Daily  metoprolol tartrate, 50 mg, oral, BID  oxygen, , inhalation, Continuous - Inhalation  polyethylene glycol, 17 g, oral, Daily  sennosides-docusate sodium, 2 tablet, oral, BID  [Held by provider] warfarin, 5 mg, oral, Daily  zinc oxide, 1 Application, Topical, TID      Continuous medications     PRN medications  PRN medications: albuterol, dextrose, dextrose, glucagon, glucagon, melatonin, ondansetron **OR** ondansetron, oxygen     Allergies  Patient has no known allergies.    Chest Radiograph   No results found for this or any previous visit from the past 10 days.       XR chest 1 view 03/06/2025    Narrative  STUDY:  Chest Radiograph;  03/06/2025 at 17:17 hours.  INDICATION:  Shortness of breath.  COMPARISON:  None Available  ACCESSION NUMBER(S):  [id number]  ORDERING CLINICIAN:  JOSÉ MANUEL JEAN-BAPTISTE  TECHNIQUE:  Frontal chest was obtained at 17:17 hours.  FINDINGS:  CARDIOMEDIASTINAL SILHOUETTE:  Cardiomediastinal silhouette is enlarged.  LUNGS:  Lungs reveals opacification left lower lobe felt represent combination  of pleural fluid and underlying infiltrate or atelectasis.  ABDOMEN:  No remarkable upper abdominal findings.    BONES:  No acute osseous changes.    Impression  Left-sided pleural effusion and underlying infiltrate or atelectasis.  Signed by Raoul Lynn,          Assessment and Plan / Recommendations   Assessment/Plan   89 y.o. male with history of Parkinson's, dementia, A-fib on Coumadin, diabetes, CAD admitted with acute hypoxic respiratory failure     1.  Acute hypoxic  respiratory failure due to pneumonia  2.  Community-acquired pneumonia, rule out aspiration with advanced Parkinson  3.  Moderate left pleural effusion on bedside ultrasound today     -Continue ceftriaxone and azithromycin  -Follow speech recommendation for diet  -Attempt/encourage incentive spirometry, Acapella, nebs as needed, wean O2 as tolerated  -Keep Coumadin on hold for thoracentesis when INR<1.8      Mathew Mari MD

## 2025-03-08 NOTE — CARE PLAN
The patient's goals for the shift include      The clinical goals for the shift include Pt will show no signs of distress, either from pain or respiratory, this shift.    Over the shift, the patient did not make progress toward the following goals. Barriers to progression include increased secretions with weak cough, limited mobility. Recommendations to address these barriers include NT suction as tolerated, administer O2 as needed, frequent repositioning, pillow support and administration of analgesics as needed.

## 2025-03-08 NOTE — CARE PLAN
The patient's goals for the shift include  to get adequate rest.     The clinical goals for the shift include Maintain patient comfort      Problem: Pain - Adult  Goal: Verbalizes/displays adequate comfort level or baseline comfort level  Outcome: Progressing     Problem: Safety - Adult  Goal: Free from fall injury  Outcome: Progressing     Problem: Discharge Planning  Goal: Discharge to home or other facility with appropriate resources  Outcome: Progressing     Problem: Chronic Conditions and Co-morbidities  Goal: Patient's chronic conditions and co-morbidity symptoms are monitored and maintained or improved  Outcome: Progressing     Problem: Nutrition  Goal: Nutrient intake appropriate for maintaining nutritional needs  Outcome: Progressing     Problem: Skin  Goal: Decreased wound size/increased tissue granulation at next dressing change  Outcome: Progressing  Goal: Participates in plan/prevention/treatment measures  Outcome: Progressing  Goal: Prevent/manage excess moisture  Outcome: Progressing  Goal: Prevent/minimize sheer/friction injuries  Outcome: Progressing  Goal: Promote/optimize nutrition  Outcome: Progressing  Goal: Promote skin healing  Outcome: Progressing

## 2025-03-08 NOTE — PROGRESS NOTES
[facility] Internal Medicine Daily Progress Note    Reilly Feliz is a 89 y.o. male on day 2 of admission presenting with Hypoxia.      Subjective   Unable to verbalize, but understood questions. Discussed with wife at bedside regarding dysphagia and pneumonia. Patient will attempt to eat with assistance from wife.     Objective     Last Recorded Vitals  /74 (BP Location: Right arm)   Pulse 105   Temp 37.1 °C (98.8 °F) (Tympanic)   Resp 17   Wt 110 kg (242 lb 8.1 oz)   SpO2 100%   Intake/Output last 3 Shifts:    Intake/Output Summary (Last 24 hours) at 3/8/2025 1150  Last data filed at 3/8/2025 0538  Gross per 24 hour   Intake 450 ml   Output 650 ml   Net -200 ml       Admission Weight  Weight: 109 kg (240 lb) (03/06/25 1713)    Daily Weight  03/06/25 : 110 kg (242 lb 8.1 oz)    Image Results  ECG 12 lead  Atrial fibrillation  Low voltage QRS  Possible Anterior infarct , age undetermined  Abnormal ECG  When compared with ECG of 06-DEC-2021 20:29,  Borderline criteria for Anterior infarct are now Present  Nonspecific T wave abnormality now evident in Anterior leads  See ED provider note for full interpretation and clinical correlation  Confirmed by Rubina Huitron ([id number]) on 3/8/2025 10:32:43 AM      Physical Exam  Vitals reviewed.   Constitutional:       Comments: Nonverbal currently   Cardiovascular:      Rate and Rhythm: Normal rate and regular rhythm.      Heart sounds: Normal heart sounds. No murmur heard.     No gallop.   Pulmonary:      Breath sounds: Normal breath sounds.   Abdominal:      General: Abdomen is flat. There is no distension.      Palpations: Abdomen is soft.      Tenderness: There is no abdominal tenderness.   Musculoskeletal:      Right lower leg: No edema.      Left lower leg: No edema.   Skin:     General: Skin is warm and dry.   Neurological:      Mental Status: He is alert.   Psychiatric:         Mood and Affect: Mood normal.         Behavior: Behavior normal.         Relevant  Results  Scheduled medications  albuterol, 3 mL, nebulization, TID  aspirin, 81 mg, oral, Daily  azithromycin, 500 mg, intravenous, q24h  carbidopa-levodopa, 1 tablet, oral, 6x daily  cefTRIAXone, 1 g, intravenous, q24h  eucerin, 1 Application, Topical, BID  insulin lispro, 0-5 Units, subcutaneous, TID AC  [Held by provider] lisinopril, 10 mg, oral, Daily  magnesium oxide, 400 mg, oral, Daily  metoprolol tartrate, 50 mg, oral, BID  oxygen, , inhalation, Continuous - Inhalation  polyethylene glycol, 17 g, oral, Daily  sennosides-docusate sodium, 2 tablet, oral, BID  [Held by provider] warfarin, 5 mg, oral, Daily  zinc oxide, 1 Application, Topical, TID      Continuous medications     PRN medications  PRN medications: albuterol, dextrose, dextrose, glucagon, glucagon, melatonin, ondansetron **OR** ondansetron, oxygen    ECG 12 lead    Result Date: 3/8/2025  Atrial fibrillation Low voltage QRS Possible Anterior infarct , age undetermined Abnormal ECG When compared with ECG of 06-DEC-2021 20:29, Borderline criteria for Anterior infarct are now Present Nonspecific T wave abnormality now evident in Anterior leads See ED provider note for full interpretation and clinical correlation Confirmed by Rubina Huitron ([id number]) on 3/8/2025 10:32:43 AM    FL modified barium swallow study    Result Date: 3/7/2025  Interpreted By:  Raoul Padilla and Mayer Katherine STUDY: FL MODIFIED BARIUM SWALLOW STUDY;; 3/7/2025 2:24 pm   INDICATION: Signs/Symptoms:Difficulty managing secretions, Parkinson's disease with cognitive impairment.     COMPARISON: None.   ACCESSION NUMBER(S): [id number]   ORDERING CLINICIAN: ANNA AVINA   TECHNIQUE: Modified Barium Swallow Study completed. Informed verbal consent obtained from his wife prior to completion of exam. The study was completed per protocol with various liquid barium consistencies, and pudding. The patient was unable to tolerate repositioning to allow for AP view of the esophagus.  Esophageal screening view was obtained in the lateral projection. A 1.9 cm or .75 inch (outer diameter) ring was placed on the chin in order to complete objective measurements during swallowing. The anatomic structures and function of the oropharynx, larynx, hypopharynx and cervical esophagus were evaluated.   SLP: Piper Rizzo SLP Contact info: [employer] chat   SPEECH FINDINGS: Reason for Referral: concern for dysphagia Patient Hx: Reilly Feliz is a 89 y.o. 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 Respiratory Status: 6 L via NC Current diet: Pureed, honey thick liquids   Pain: Pain Scale: PAINAD Rating: no pain   DIET RECOMMENDATIONS:   Per the results of today's MBSS, patient to take a diet of pureed foods and IDDSI 3/honey thick liquids vs Non oral nutrition and hydration. Water between meals and after oral care per Kam Free Water Protocol. Risk for aspiration remains high with even modified oral diet.   STRATEGIES: . Upright posture for po intake . Alternate liquids and solids . Encourage double swallows . Oral care after intake . Water between meals per Kam Free Water Protocol: Pt may have sips of thin water under the following conditions only: no food present, within 2 hours after oral care, when fully alert and upright. All liquids besides water must be thickened.     SLP PLAN: Skilled SLP Services: Skilled SLP intervention for dysphagia is warranted. SLP Frequency: 1-2 follow up sessions. Duration: current admit. Treatment/Interventions: - Compensatory strategy training - Patient/caregiver education   Discussed POC: Patient Discussed Risks/Benefits: Yes Patient/Caregiver Agreeable: Yes   Short term goals established 03/07/25: . Pt will tolerate a pureed diet with IDDSI 3/honey thick liquids  without dysphagia.   . His wife will demonstrate risk for patient's aspiration and strategies to maximize functional safety independently.     Long term goals 03/07/25: Patient will tolerate the least restrictive diet without overt difficulty or further pulmonary compromise by time of discharge.     Education Provided: Results and recommendations per MBSS, with video review; recommendations and POC at this time. Verbal understanding and agreement given on all accounts.   Treatment Provided Today: ST provided extensive education and training to pt/pt family regarding anatomy/physiology of swallow function, risk factors of aspiration/aspiration pna & how to mitigate factors, diet modifications, and the use of compensatory swallow strategies to promote pt safety upon PO intake including upright posture, alternating liquids and solids and ongoing significant risk for aspiration of all oral intake.   Additional Medical Consults Suggested: - No new disciplines indicated   Repeat Study: Per MD or treating SLP     Mechanics of the Swallow Summary: ORAL PHASE: Lip Closure - No labial escape/anterior loss of bolus Tongue Control During Bolus Hold - Posterior escape of less than half of the bolus Bolus prep/mastication - Minimal mastication noted with majority of bolus left unchewed Bolus transport/lingual motion - Slowed Tongue Motion for A-P movement of the bolus Oral residue - Residue collection on oral structure - on and under tongue   PHARYNGEAL PHASE: Initiation of pharyngeal swallow - Bolus head at pit of pyriforms Soft palate elevation - No bolus between soft palate/pharyngeal wall Laryngeal elevation - Partial superior movement of thyroid cartilage and/or partial approximation of arytenoids to epiglottic petiole Anterior hyoid excursion - Partial anterior movement Epiglottic movement - Partial inversion Laryngeal vestibule closure - Incomplete - narrow column of air/contrast in laryngeal vestibule Pharyngeal stripping  wave - Present, however, diminished Pharyngeal contraction (A/P view) - Not tested Pharyngoesophageal segment opening - Partial distension/partial duration with partial obstruction of flow of bolus Tongue base retraction - Wide column of contrast or air between tongue base and pharyngeal wall Pharyngeal residue - Collection of residue within or on the pharyngeal structures  - valleculae and pyriform sinuses.  Spilled into airway. ESOPHAGEAL PHASE: Esophageal clearance - Esophageal retention with retrograde flow below the pharyngoesophageal segment     SLP Impressions with Severity Rating: Pt presents with severe oropharyngeal dysphagia upon completion of modified barium swallow study this date. Swallowing physiology is detailed above. Impairments most impacting swallowing safety and efficiency include poor airway protection with absent epiglottic deflection, weak laryngeal elevation and closure. Patient demonstrated aspiration after swallows of thin and nectar thick liquids and deep laryngeal penetration with honey consistency liquids/ significant risk for aspiration of same. Pharyngeal residue after all consistencies with significant risk for aspiration of same.   *Of note: The A-P bolus follow-through is not intended to be utilized as a diagnostic assessment of the esophagus, rather a tool to observe the biomechanical aspects of the swallow continuum and to inform the need for further evaluation by medical specialists, as applicable.   Strategies attempted- Pt inconsistently re swallowed to clear residue upon request.     OUTCOME MEASURES: Functional Oral Intake Scale Functional Oral Intake Scale: Level 4        total oral diet of a single consistency     Rosenbek's Penetration Aspiration Scale Thin Liquids: 8. SILENT ASPIRATION - contrast passes glottis, visible residue, NO pt response After the Swallow Nectar Thick Liquids: 8. SILENT ASPIRATION - contrast passes glottis, visible residue, NO pt response After the  Swallow Honey Thick Liquids: 5. DEEP PENETRATION with HIGH ASPIRATION risk - contrast contacts vocal cords, visible residue Before the Swallow After the Swallow Puree: 2. PENETRATION that CLEARS - contrast enter airway, above vocal cords, no residue   Speech Therapy section of this report signed by Piper Rizzo MS, CCC/SLP on 3/7/2025 at 3:15 pm.   RADIOLOGY FINDINGS: Agree with above. Degenerative changes seen of the spine.   Radiology section of this report signed by Dr. Padilla.       As above.   MACRO: None   Signed by: Raoul Padilla 3/7/2025 3:47 PM Dictation workstation:   [id number]    XR chest 1 view    Result Date: 3/6/2025  STUDY: Chest Radiograph;  03/06/2025 at 17:17 hours. INDICATION: Shortness of breath. COMPARISON: None Available ACCESSION NUMBER(S): [id number] ORDERING CLINICIAN: JOSÉ MANUEL JEAN-BAPTISTE TECHNIQUE:  Frontal chest was obtained at 17:17 hours. FINDINGS: CARDIOMEDIASTINAL SILHOUETTE: Cardiomediastinal silhouette is enlarged. LUNGS: Lungs reveals opacification left lower lobe felt represent combination of pleural fluid and underlying infiltrate or atelectasis. ABDOMEN: No remarkable upper abdominal findings.  BONES: No acute osseous changes.    Left-sided pleural effusion and underlying infiltrate or atelectasis. Signed by Raoul Lynn DO     Results for orders placed or performed during the hospital encounter of 03/06/25 (from the past 24 hours)   Protime-INR   Result Value Ref Range    Protime 43.5 (H) 9.8 - 12.4 seconds    INR 3.9 (H) 0.9 - 1.1   Magnesium   Result Value Ref Range    Magnesium 1.80 1.60 - 2.40 mg/dL   Renal Function Panel   Result Value Ref Range    Glucose 109 (H) 74 - 99 mg/dL    Sodium 136 136 - 145 mmol/L    Potassium 4.5 3.5 - 5.3 mmol/L    Chloride 99 98 - 107 mmol/L    Bicarbonate 28 21 - 32 mmol/L    Anion Gap 14 10 - 20 mmol/L    Urea Nitrogen 29 (H) 6 - 23 mg/dL    Creatinine 0.90 0.50 - 1.30 mg/dL    eGFR 82 >60 mL/min/1.73m*2    Calcium 8.5 (L) 8.6 -  10.3 mg/dL    Phosphorus 2.6 2.5 - 4.9 mg/dL    Albumin 2.8 (L) 3.4 - 5.0 g/dL   CBC   Result Value Ref Range    WBC 12.7 (H) 4.4 - 11.3 x10*3/uL    nRBC 0.0 0.0 - 0.0 /100 WBCs    RBC 3.47 (L) 4.50 - 5.90 x10*6/uL    Hemoglobin 10.2 (L) 13.5 - 17.5 g/dL    Hematocrit 31.9 (L) 41.0 - 52.0 %    MCV 92 80 - 100 fL    MCH 29.4 26.0 - 34.0 pg    MCHC 32.0 32.0 - 36.0 g/dL    RDW 16.0 (H) 11.5 - 14.5 %    Platelets 271 150 - 450 x10*3/uL   POCT GLUCOSE   Result Value Ref Range    POCT Glucose 76 74 - 99 mg/dL                  Assessment/Plan        Assessment & Plan  Hypoxia    Hypotension    Pleural effusion on left    Aspiration pneumonia (Multi)      Reilly Feliz is a 89 y.o. 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 Admitted for AHRF 2/2 aspiration PNA vs pleural effusion.      ACUTE ISSUES:  #AHRF 2/2 aspiration PNA vs CAP vs atelectasis  #L pleural effusion  -On 1L NC, baseline RA  -CXR: L sided pleural effusion and underlying infiltrate or atelectasis   -Lactate 2.6 > 1.5  Plan:  -Continue ceftriaxone (3/6 -), azithromycin (3/6 - 3/8)  -Nebs/DuoNebs  -IS  -Procalcitonin 0.23, urine Legionella/strep - negative,  MRSA pending  -Urine cx - negative, blood culture negative x 1  -SLP consulted for C/F aspiration - cleared for upright posture for PO intake with alternate liquids/solids. Minced and moist diet/ IDDDSI level 5 diet, honey thick liquids. Patient has severe dysphagia and continues to have high risk of aspiration.   -Pulmonology consulted for possible thoracentesis - will continue to hold Coumadin until INR improves.      #Severe Parkinson's:   Plan:  -Home carbidopa-levodopa  mg 6x daily  -Palliative consulted     #Hypotension- resolved  -S/p 1L LR, 500 mL NS  -Likely 2/2 dehydration  Plan:  -CTM     #Facial lesion   -2-3 cm black, scabbing lesion reported to have grown per wife  -Previously put  mupirocin on it which did not appear to help  Plan:  -Recommend outpatient dermatology  -Wound care consulted     CHRONIC ISSUES:  #A Fib: HOLD home warfarin and continue metoprolol  #HTN: HOLD home ramipril 5 mg daily (substituted for lisinopril 10 mg)  #T2DM W/O insulin: Hold home metformin 500 mg BID and on SSI #1  #CAD s/p stent: Home ASA 81 mg daily     Fluid: Replete PRN  Electrolytes: Replete PRN  Nutrition: soft diet  GI PPx: none  DVT/PE PPx: warfarin HELD for possible thoracentesis   Abx: ceftriaxone, azithromycin  IV Lines: PIV  O2: RA     Disposition: 89M admitted for AHRF 2/2 aspiration PNA vs pleural effusion. Continue medical management. Ongoing goals of care. Palliative care consulted. Estimated discharge date next week pending goals of care.     Yves Ortiz, DO  Internal Medicine, PGY-II

## 2025-03-09 VITALS
OXYGEN SATURATION: 99 % | HEART RATE: 103 BPM | TEMPERATURE: 97.9 F | DIASTOLIC BLOOD PRESSURE: 62 MMHG | WEIGHT: 242.51 LBS | BODY MASS INDEX: 32.85 KG/M2 | RESPIRATION RATE: 16 BRPM | SYSTOLIC BLOOD PRESSURE: 113 MMHG | HEIGHT: 72 IN

## 2025-03-09 LAB
BACTERIA BLD CULT: NORMAL
BACTERIA BLD CULT: NORMAL

## 2025-03-09 PROCEDURE — 2500000001 HC RX 250 WO HCPCS SELF ADMINISTERED DRUGS (ALT 637 FOR MEDICARE OP)

## 2025-03-09 PROCEDURE — 1200000002 HC GENERAL ROOM WITH TELEMETRY DAILY

## 2025-03-09 PROCEDURE — 2500000005 HC RX 250 GENERAL PHARMACY W/O HCPCS: Performed by: BEHAVIOR TECHNICIAN

## 2025-03-09 PROCEDURE — 99233 SBSQ HOSP IP/OBS HIGH 50: CPT | Performed by: INTERNAL MEDICINE

## 2025-03-09 PROCEDURE — 2500000004 HC RX 250 GENERAL PHARMACY W/ HCPCS (ALT 636 FOR OP/ED): Performed by: BEHAVIOR TECHNICIAN

## 2025-03-09 PROCEDURE — 99232 SBSQ HOSP IP/OBS MODERATE 35: CPT | Performed by: INTERNAL MEDICINE

## 2025-03-09 PROCEDURE — 2500000001 HC RX 250 WO HCPCS SELF ADMINISTERED DRUGS (ALT 637 FOR MEDICARE OP): Performed by: BEHAVIOR TECHNICIAN

## 2025-03-09 PROCEDURE — 82947 ASSAY GLUCOSE BLOOD QUANT: CPT

## 2025-03-09 RX ORDER — ACETAMINOPHEN 325 MG/1
975 TABLET ORAL EVERY 6 HOURS PRN
Status: DISPENSED | OUTPATIENT
Start: 2025-03-09

## 2025-03-09 RX ADMIN — CARBIDOPA AND LEVODOPA 1 TABLET: 25; 100 TABLET ORAL at 21:47

## 2025-03-09 RX ADMIN — CARBIDOPA AND LEVODOPA 1 TABLET: 25; 100 TABLET ORAL at 16:00

## 2025-03-09 RX ADMIN — Medication 1 L/MIN: at 20:00

## 2025-03-09 RX ADMIN — CARBIDOPA AND LEVODOPA 1 TABLET: 25; 100 TABLET ORAL at 14:27

## 2025-03-09 RX ADMIN — CARBIDOPA AND LEVODOPA 1 TABLET: 25; 100 TABLET ORAL at 01:00

## 2025-03-09 RX ADMIN — CEFTRIAXONE SODIUM 1 G: 1 INJECTION, SOLUTION INTRAVENOUS at 17:00

## 2025-03-09 RX ADMIN — Medication 4 L/MIN: at 07:58

## 2025-03-09 RX ADMIN — ACETAMINOPHEN 975 MG: 325 TABLET ORAL at 21:57

## 2025-03-09 RX ADMIN — METOPROLOL TARTRATE 50 MG: 50 TABLET, FILM COATED ORAL at 21:47

## 2025-03-09 RX ADMIN — Medication 1 APPLICATION: at 14:29

## 2025-03-09 RX ADMIN — CARBIDOPA AND LEVODOPA 1 TABLET: 25; 100 TABLET ORAL at 06:30

## 2025-03-09 RX ADMIN — SKIN PROTECTANT 1 APPLICATION: 33 OINTMENT TOPICAL at 21:56

## 2025-03-09 RX ADMIN — CARBIDOPA AND LEVODOPA 1 TABLET: 25; 100 TABLET ORAL at 18:51

## 2025-03-09 ASSESSMENT — PAIN SCALES - GENERAL: PAINLEVEL_OUTOF10: 0 - NO PAIN

## 2025-03-09 NOTE — PROGRESS NOTES
Department of Medicine  Division of Pulmonary, Critical Care, and Sleep Medicine    Reilly Feliz is a 89 y.o. male on day 3 of admission presenting with Hypoxia.    Subjective   Stable, no events.       Objective     Vital Signs      3/8/2025    11:35 AM 3/8/2025     4:23 PM 3/8/2025     7:20 PM 3/8/2025     9:00 PM 3/9/2025    12:12 AM 3/9/2025     8:08 AM 3/9/2025    11:57 AM   Vitals   Systolic 120 141  97 101 -- 93   Diastolic 74 84  58 59 -- 58   BP Location Right arm Right arm   Right arm  Right arm   Heart Rate 105 78 78  90 116 94   Temp 37.1 °C (98.8 °F) 36.5 °C (97.7 °F)   37.1 °C (98.8 °F) -- 37.3 °C (99.1 °F)   Resp 17 19 18  16 21 16        Physical exam  Constitutional: Awake, comfortable in bed, in no distress.  HEENT: Normocephalic and atraumatic.  Cardiovascular: Normal rate and regular rhythm.  Pulmonary: Coarse breath sounds bilaterally, no wheezing.  Musculoskeletal: No edema, no cyanosis.  Neurological: Awake, answering simple questions, not oriented  Psychiatric: Pleasantly confused    Labs:  Lab Results   Component Value Date    WBC 12.7 (H) 03/08/2025    HGB 10.2 (L) 03/08/2025    HCT 31.9 (L) 03/08/2025    MCV 92 03/08/2025     03/08/2025      Lab Results   Component Value Date    GLUCOSE 109 (H) 03/08/2025    CALCIUM 8.5 (L) 03/08/2025     03/08/2025    K 4.5 03/08/2025    CO2 28 03/08/2025    CL 99 03/08/2025    BUN 29 (H) 03/08/2025    CREATININE 0.90 03/08/2025      Lab Results   Component Value Date    ALT <3 (L) 03/06/2025    AST 6 (L) 03/06/2025    ALKPHOS 73 03/06/2025    BILITOT 1.6 (H) 03/06/2025        Oxygen Therapy  SpO2: 99 %  Medical Gas Therapy: Supplemental oxygen  Medical Gas Delivery Method: Nasal cannula  FiO2 (%):  [24 %-36 %] 36 %    Intake/Output last 3 Shifts:  I/O last 3 completed shifts:  In: 310 (2.8 mL/kg) [P.O.:60; IV Piggyback:250]  Out: 1450 (13.2 mL/kg) [Urine:1450 (0.4 mL/kg/hr)]  Weight: 110 kg       Medications   Scheduled  medications  albuterol, 3 mL, nebulization, TID  aspirin, 81 mg, oral, Daily  carbidopa-levodopa, 1 tablet, oral, 6x daily  cefTRIAXone, 1 g, intravenous, q24h  eucerin, 1 Application, Topical, BID  insulin lispro, 0-5 Units, subcutaneous, TID AC  [Held by provider] lisinopril, 10 mg, oral, Daily  magnesium oxide, 400 mg, oral, Daily  metoprolol tartrate, 50 mg, oral, BID  oxygen, , inhalation, Continuous - Inhalation  polyethylene glycol, 17 g, oral, Daily  sennosides-docusate sodium, 2 tablet, oral, BID  [Held by provider] warfarin, 5 mg, oral, Daily  zinc oxide, 1 Application, Topical, TID      Continuous medications     PRN medications  PRN medications: albuterol, dextrose, dextrose, glucagon, glucagon, melatonin, ondansetron **OR** ondansetron, oxygen     Allergies  Patient has no known allergies.    Chest Radiograph   No results found for this or any previous visit from the past 10 days.       XR chest 1 view 03/06/2025    Narrative  STUDY:  Chest Radiograph;  03/06/2025 at 17:17 hours.  INDICATION:  Shortness of breath.  COMPARISON:  None Available  ACCESSION NUMBER(S):  [id number]  ORDERING CLINICIAN:  JOSÉ MANUEL JEAN-BAPTISTE  TECHNIQUE:  Frontal chest was obtained at 17:17 hours.  FINDINGS:  CARDIOMEDIASTINAL SILHOUETTE:  Cardiomediastinal silhouette is enlarged.  LUNGS:  Lungs reveals opacification left lower lobe felt represent combination  of pleural fluid and underlying infiltrate or atelectasis.  ABDOMEN:  No remarkable upper abdominal findings.    BONES:  No acute osseous changes.    Impression  Left-sided pleural effusion and underlying infiltrate or atelectasis.  Signed by Raoul Lynn,          Assessment and Plan / Recommendations   Assessment/Plan   89 y.o. male with history of Parkinson's, dementia, A-fib on Coumadin, diabetes, CAD admitted with acute hypoxic respiratory failure     1.  Acute hypoxic respiratory failure due to pneumonia  2.  Community-acquired pneumonia, rule out aspiration with  advanced Parkinson  3.  Moderate left pleural effusion on bedside ultrasound    Stable on 2 L with sat high 90s.      -Continue ceftriaxone, completed azithromycin  -Follow speech recommendation for diet  -Attempt/encourage incentive spirometry, Acapella, nebs as needed, wean O2 as tolerated  -Keep Coumadin on hold for thoracentesis when INR<1.8    Patient is DNR/DNI, primary addressing goals of care with family.      Mathew Mari MD

## 2025-03-09 NOTE — CARE PLAN
The patient's goals for the shift include  can't assess    The clinical goals for the shift include Pt will show no signs of distress, either from pain or respiratory, this shift.

## 2025-03-09 NOTE — PROGRESS NOTES
Subjective   Subjective:   History Of Present Illness:  Reilly Feliz is a 89 y.o. male was seen and evaluated at bedside today. Overnight, no reported acute events. Patient is at no acute distress.  Patient states that he does not want to have his vital checked and is refusing medications. States that he wants to talk with his wife.  On room air.     Objective   Objective:     /59 (BP Location: Right arm, Patient Position: Lying)   Pulse 90   Temp 37.1 °C (98.8 °F) (Temporal)   Resp 21   Ht 1.829 m (6')   Wt 110 kg (242 lb 8.1 oz)   SpO2 99%   BMI 32.89 kg/m²     Physical Exam  Constitutional:       General: He is not in acute distress.  Cardiovascular:      Rate and Rhythm: Normal rate.   Pulmonary:      Effort: Pulmonary effort is normal. No respiratory distress.      Breath sounds: Rhonchi and rales present.   Musculoskeletal:      Comments: Trace b/l LE edema   Neurological:      Mental Status: He is alert.       Lab Work:     Lab Results   Component Value Date    WBC 12.7 (H) 03/08/2025    HGB 10.2 (L) 03/08/2025    HCT 31.9 (L) 03/08/2025    MCV 92 03/08/2025     03/08/2025     Lab Results   Component Value Date    GLUCOSE 109 (H) 03/08/2025    CALCIUM 8.5 (L) 03/08/2025     03/08/2025    K 4.5 03/08/2025    CO2 28 03/08/2025    CL 99 03/08/2025    BUN 29 (H) 03/08/2025    CREATININE 0.90 03/08/2025     Hemoglobin A1C   Date Value Ref Range Status   03/06/2025 6.1 (H) See comment % Final   07/24/2018 6.3 (H) 4.0 - 6.0 % Final     Comment:     Hemoglobin A1C levels are related to mean blood glucose during the   preceding 2-3 months. The relationship table below may be used as a   general guide. Each 1% increase in HGB A1C is a reflection of an   increase in mean glucose of approximately 30 mg/dl.   Reference: Diabetes Care, volume 29, supplement 1 Jan. 2006                        HGB A1C ................. Approx. Mean Glucose   _______________________________________________   6%    ...............................  120 mg/dl   7%   ...............................  150 mg/dl   8%   ...............................  180 mg/dl   9%   ...............................  210 mg/dl   10%  ...............................  240 mg/dl  Performed at [facility] [id number]       TSH   Date Value Ref Range Status   01/07/2020 0.78 0.44 - 3.98 mIU/L Final     Comment:      TSH testing is performed using different testing    methodology at [facility] than at other    [facility]. Direct result comparisons should    only be made within the same method.       Thyroid Stimulating Hormone   Date Value Ref Range Status   07/24/2018 1.36 0.27 - 4.20 MIU/L Final     Comment:     THIS TSH ASSAY HAS A FUNCTIONAL SENSITIVITY OF 0.01 MIU/L. THIS MEETS   REQUIREMENTS OF A 3RD GENERATION HIGH SENSITIVE TSH ASSAY.  Performed at [place] [id number]       Vitamin D, 25-Hydroxy   Date Value Ref Range Status   01/07/2020 27 (A) ng/mL Final     Comment:     .  DEFICIENCY:         < 20  NG/ML  INSUFFICIENCY:      20-29 NG/ML  OPTIMUM LEVEL:      30-80 NG/ML  POSSIBLE TOXICITY:  > 80  NG/ML    THIS ASSAY ACCURATELY QUANTIFIES THE SUM OF  VITAMIN D3, 25-HYDROXY AND VIT D2,25-HYDROXY.       Cultures:   No results found for the last 90 days.    Images:     XR chest 1 view   Final Result   Left-sided pleural effusion and underlying infiltrate or atelectasis.   Signed by Raoul Lynn DO         Medications:     Scheduled:  albuterol, 3 mL, nebulization, TID  aspirin, 81 mg, oral, Daily  carbidopa-levodopa, 1 tablet, oral, 6x daily  cefTRIAXone, 1 g, intravenous, q24h  eucerin, 1 Application, Topical, BID  insulin lispro, 0-5 Units, subcutaneous, TID AC  [Held by provider] lisinopril, 10 mg, oral, Daily  magnesium oxide, 400 mg, oral, Daily  metoprolol tartrate, 50 mg, oral, BID  oxygen, , inhalation, Continuous - Inhalation  polyethylene glycol, 17 g, oral,  Daily  sennosides-docusate sodium, 2 tablet, oral, BID  [Held by provider] warfarin, 5 mg, oral, Daily  zinc oxide, 1 Application, Topical, TID    Continuous:     PRN:  PRN medications: albuterol, dextrose, dextrose, glucagon, glucagon, melatonin, ondansetron **OR** ondansetron, oxygen     Assessment & Plan:     Reilly Feliz is a 89 y.o. 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 Admitted for AHRF 2/2 aspiration PNA vs pleural effusion.     ACUTE ISSUES:  #AHRF 2/2 aspiration PNA vs CAP vs atelectasis  #L pleural effusion  -CXR: L sided pleural effusion and underlying infiltrate or atelectasis   -Lactate 2.6 > 1.5  -Urine cx -  -Urine Legionella/strep -  -Bcx Ng2d  -Procalcitonin 0.23  Plan:  -Continue ceftriaxone (3/6 -), azithromycin (3/6 - 3/8)  -Nebs/DuoNebs  -IS  -SLP cleared for minced/moist and honey thick liquids. Patient has severe dysphagia and continues to have high risk of aspiration.   -Pulmonology consulted; Keep Coumadin on hold for thoracentesis when INR<1.8     #Severe Parkinson's:   -Patient refusing vitals monitoring, meds  Plan:  -Home carbidopa-levodopa  mg 6x daily  -Palliative consulted- cannot see until Monday  -Can discuss with family today     #Hypotension- resolved  -S/p 1L LR, 500 mL NS  -Likely 2/2 dehydration  Plan:  -CTM     #Facial lesion   -2-3 cm black, scabbing lesion reported to have grown per wife  -Previously put mupirocin on it which did not appear to help  Plan:  -Recommend outpatient dermatology  -Wound care consulted    CHRONIC ISSUES:  #A Fib: HOLD home warfarin and continue metoprolol  #HTN: HOLD home ramipril 5 mg daily (substituted for lisinopril 10 mg) for soft BP  #T2DM W/O insulin: Hold home metformin 500 mg BID and on SSI #1  #CAD s/p stent: Home ASA 81 mg daily    Fluid: Replete PRN  Electrolytes: Replete PRN  Nutrition: minced/moist, honey thick liquids  GI  PPx: none  DVT/PE PPx: warfarin HELD for possible procedure  Abx: ceftriaxone, azithromycin  IV Lines: PIV  O2: RA    Disposition: 89M admitted for AHRF 2/2 aspiration PNA vs pleural effusion. Discharge pending family discussion.    Aleida Kaur DO    Disclaimer: Documentation completed with the information available at the time of input. The times in the chart may not be reflective of actual patient care times, interventions, or procedures. Documentation occurs after the physical care of the patient.

## 2025-03-10 PROBLEM — F02.C0 SEVERE DEMENTIA DUE TO PARKINSON'S DISEASE, WITHOUT BEHAVIORAL DISTURBANCE, PSYCHOTIC DISTURBANCE, MOOD DISTURBANCE, OR ANXIETY (MULTI): Status: ACTIVE | Noted: 2025-03-06

## 2025-03-10 PROBLEM — G20.A1 SEVERE DEMENTIA DUE TO PARKINSON'S DISEASE, WITHOUT BEHAVIORAL DISTURBANCE, PSYCHOTIC DISTURBANCE, MOOD DISTURBANCE, OR ANXIETY (MULTI): Status: ACTIVE | Noted: 2025-03-06

## 2025-03-10 LAB
ALBUMIN SERPL BCP-MCNC: 3 G/DL (ref 3.4–5)
ANION GAP SERPL CALC-SCNC: 12 MMOL/L (ref 10–20)
BUN SERPL-MCNC: 22 MG/DL (ref 6–23)
CALCIUM SERPL-MCNC: 8.4 MG/DL (ref 8.6–10.3)
CHLORIDE SERPL-SCNC: 101 MMOL/L (ref 98–107)
CO2 SERPL-SCNC: 29 MMOL/L (ref 21–32)
CREAT SERPL-MCNC: 0.71 MG/DL (ref 0.5–1.3)
EGFRCR SERPLBLD CKD-EPI 2021: 88 ML/MIN/1.73M*2
ERYTHROCYTE [DISTWIDTH] IN BLOOD BY AUTOMATED COUNT: 15.6 % (ref 11.5–14.5)
GLUCOSE BLD MANUAL STRIP-MCNC: 128 MG/DL (ref 74–99)
GLUCOSE BLD MANUAL STRIP-MCNC: 134 MG/DL (ref 74–99)
GLUCOSE BLD MANUAL STRIP-MCNC: 140 MG/DL (ref 74–99)
GLUCOSE BLD MANUAL STRIP-MCNC: 85 MG/DL (ref 74–99)
GLUCOSE SERPL-MCNC: 93 MG/DL (ref 74–99)
HCT VFR BLD AUTO: 33 % (ref 41–52)
HGB BLD-MCNC: 11.1 G/DL (ref 13.5–17.5)
INR PPP: 2.7 (ref 0.9–1.1)
MAGNESIUM SERPL-MCNC: 1.89 MG/DL (ref 1.6–2.4)
MCH RBC QN AUTO: 29.1 PG (ref 26–34)
MCHC RBC AUTO-ENTMCNC: 33.6 G/DL (ref 32–36)
MCV RBC AUTO: 86 FL (ref 80–100)
NRBC BLD-RTO: 0 /100 WBCS (ref 0–0)
PHOSPHATE SERPL-MCNC: 2.1 MG/DL (ref 2.5–4.9)
PLATELET # BLD AUTO: 232 X10*3/UL (ref 150–450)
POTASSIUM SERPL-SCNC: 4.2 MMOL/L (ref 3.5–5.3)
PROTHROMBIN TIME: 29.8 SECONDS (ref 9.8–12.4)
RBC # BLD AUTO: 3.82 X10*6/UL (ref 4.5–5.9)
SODIUM SERPL-SCNC: 138 MMOL/L (ref 136–145)
WBC # BLD AUTO: 6.4 X10*3/UL (ref 4.4–11.3)

## 2025-03-10 PROCEDURE — 94760 N-INVAS EAR/PLS OXIMETRY 1: CPT

## 2025-03-10 PROCEDURE — 92526 ORAL FUNCTION THERAPY: CPT | Mod: GN

## 2025-03-10 PROCEDURE — 80069 RENAL FUNCTION PANEL: CPT | Performed by: BEHAVIOR TECHNICIAN

## 2025-03-10 PROCEDURE — 85027 COMPLETE CBC AUTOMATED: CPT | Performed by: BEHAVIOR TECHNICIAN

## 2025-03-10 PROCEDURE — 94668 MNPJ CHEST WALL SBSQ: CPT

## 2025-03-10 PROCEDURE — 94640 AIRWAY INHALATION TREATMENT: CPT

## 2025-03-10 PROCEDURE — 2500000001 HC RX 250 WO HCPCS SELF ADMINISTERED DRUGS (ALT 637 FOR MEDICARE OP)

## 2025-03-10 PROCEDURE — 1100000001 HC PRIVATE ROOM DAILY

## 2025-03-10 PROCEDURE — 83735 ASSAY OF MAGNESIUM: CPT | Performed by: BEHAVIOR TECHNICIAN

## 2025-03-10 PROCEDURE — 2500000001 HC RX 250 WO HCPCS SELF ADMINISTERED DRUGS (ALT 637 FOR MEDICARE OP): Performed by: BEHAVIOR TECHNICIAN

## 2025-03-10 PROCEDURE — 2500000005 HC RX 250 GENERAL PHARMACY W/O HCPCS: Performed by: BEHAVIOR TECHNICIAN

## 2025-03-10 PROCEDURE — 2500000004 HC RX 250 GENERAL PHARMACY W/ HCPCS (ALT 636 FOR OP/ED): Performed by: INTERNAL MEDICINE

## 2025-03-10 PROCEDURE — 99233 SBSQ HOSP IP/OBS HIGH 50: CPT

## 2025-03-10 PROCEDURE — 2500000004 HC RX 250 GENERAL PHARMACY W/ HCPCS (ALT 636 FOR OP/ED): Performed by: BEHAVIOR TECHNICIAN

## 2025-03-10 PROCEDURE — 2500000005 HC RX 250 GENERAL PHARMACY W/O HCPCS: Performed by: INTERNAL MEDICINE

## 2025-03-10 PROCEDURE — 36415 COLL VENOUS BLD VENIPUNCTURE: CPT | Performed by: BEHAVIOR TECHNICIAN

## 2025-03-10 PROCEDURE — 99232 SBSQ HOSP IP/OBS MODERATE 35: CPT | Performed by: INTERNAL MEDICINE

## 2025-03-10 PROCEDURE — 85610 PROTHROMBIN TIME: CPT

## 2025-03-10 PROCEDURE — 2500000002 HC RX 250 W HCPCS SELF ADMINISTERED DRUGS (ALT 637 FOR MEDICARE OP, ALT 636 FOR OP/ED): Performed by: INTERNAL MEDICINE

## 2025-03-10 PROCEDURE — 82947 ASSAY GLUCOSE BLOOD QUANT: CPT

## 2025-03-10 PROCEDURE — 2500000004 HC RX 250 GENERAL PHARMACY W/ HCPCS (ALT 636 FOR OP/ED)

## 2025-03-10 RX ADMIN — Medication 1 APPLICATION: at 09:00

## 2025-03-10 RX ADMIN — SKIN PROTECTANT 1 APPLICATION: 33 OINTMENT TOPICAL at 21:29

## 2025-03-10 RX ADMIN — CARBIDOPA AND LEVODOPA 1 TABLET: 25; 100 TABLET ORAL at 07:00

## 2025-03-10 RX ADMIN — CEFTRIAXONE SODIUM 1 G: 1 INJECTION, SOLUTION INTRAVENOUS at 17:00

## 2025-03-10 RX ADMIN — ASPIRIN 81 MG: 81 TABLET, COATED ORAL at 09:50

## 2025-03-10 RX ADMIN — ALBUTEROL SULFATE 3 ML: 2.5 SOLUTION RESPIRATORY (INHALATION) at 20:13

## 2025-03-10 RX ADMIN — SENNOSIDES AND DOCUSATE SODIUM 2 TABLET: 50; 8.6 TABLET ORAL at 21:20

## 2025-03-10 RX ADMIN — CARBIDOPA AND LEVODOPA 1 TABLET: 25; 100 TABLET ORAL at 12:22

## 2025-03-10 RX ADMIN — CARBIDOPA AND LEVODOPA 1 TABLET: 25; 100 TABLET ORAL at 10:00

## 2025-03-10 RX ADMIN — ACETAMINOPHEN 975 MG: 325 TABLET ORAL at 21:20

## 2025-03-10 RX ADMIN — CARBIDOPA AND LEVODOPA 1 TABLET: 25; 100 TABLET ORAL at 16:14

## 2025-03-10 RX ADMIN — Medication 400 MG: at 09:50

## 2025-03-10 RX ADMIN — SENNOSIDES AND DOCUSATE SODIUM 2 TABLET: 50; 8.6 TABLET ORAL at 09:50

## 2025-03-10 RX ADMIN — CARBIDOPA AND LEVODOPA 1 TABLET: 25; 100 TABLET ORAL at 21:21

## 2025-03-10 RX ADMIN — POLYETHYLENE GLYCOL 3350 17 G: 17 POWDER, FOR SOLUTION ORAL at 09:50

## 2025-03-10 RX ADMIN — Medication 1 APPLICATION: at 21:21

## 2025-03-10 RX ADMIN — Medication 1 L/MIN: at 09:22

## 2025-03-10 RX ADMIN — SODIUM PHOSPHATE, MONOBASIC, MONOHYDRATE AND SODIUM PHOSPHATE, DIBASIC, ANHYDROUS 15 MMOL: 276; 142 INJECTION, SOLUTION INTRAVENOUS at 14:48

## 2025-03-10 RX ADMIN — METOPROLOL TARTRATE 50 MG: 50 TABLET, FILM COATED ORAL at 21:21

## 2025-03-10 RX ADMIN — ALBUTEROL SULFATE 3 ML: 2.5 SOLUTION RESPIRATORY (INHALATION) at 15:07

## 2025-03-10 RX ADMIN — Medication 1 APPLICATION: at 14:55

## 2025-03-10 RX ADMIN — SKIN PROTECTANT 1 APPLICATION: 33 OINTMENT TOPICAL at 09:00

## 2025-03-10 RX ADMIN — METOPROLOL TARTRATE 50 MG: 50 TABLET, FILM COATED ORAL at 09:50

## 2025-03-10 ASSESSMENT — COGNITIVE AND FUNCTIONAL STATUS - GENERAL
MOVING TO AND FROM BED TO CHAIR: TOTAL
TOILETING: TOTAL
WALKING IN HOSPITAL ROOM: TOTAL
HELP NEEDED FOR BATHING: TOTAL
PERSONAL GROOMING: TOTAL
TURNING FROM BACK TO SIDE WHILE IN FLAT BAD: TOTAL
WALKING IN HOSPITAL ROOM: TOTAL
TOILETING: TOTAL
EATING MEALS: TOTAL
MOBILITY SCORE: 6
DAILY ACTIVITIY SCORE: 6
DRESSING REGULAR LOWER BODY CLOTHING: TOTAL
HELP NEEDED FOR BATHING: TOTAL
DRESSING REGULAR LOWER BODY CLOTHING: TOTAL
PERSONAL GROOMING: TOTAL
DRESSING REGULAR UPPER BODY CLOTHING: TOTAL
DRESSING REGULAR UPPER BODY CLOTHING: TOTAL
TURNING FROM BACK TO SIDE WHILE IN FLAT BAD: TOTAL
CLIMB 3 TO 5 STEPS WITH RAILING: TOTAL
MOVING FROM LYING ON BACK TO SITTING ON SIDE OF FLAT BED WITH BEDRAILS: TOTAL
MOBILITY SCORE: 6
MOVING TO AND FROM BED TO CHAIR: TOTAL
MOVING FROM LYING ON BACK TO SITTING ON SIDE OF FLAT BED WITH BEDRAILS: TOTAL
STANDING UP FROM CHAIR USING ARMS: TOTAL
EATING MEALS: TOTAL
DAILY ACTIVITIY SCORE: 6
STANDING UP FROM CHAIR USING ARMS: TOTAL
CLIMB 3 TO 5 STEPS WITH RAILING: TOTAL

## 2025-03-10 ASSESSMENT — PAIN SCALES - PAIN ASSESSMENT IN ADVANCED DEMENTIA (PAINAD)
TOTALSCORE: 0
BODYLANGUAGE: RELAXED
BREATHING: NORMAL
CONSOLABILITY: NO NEED TO CONSOLE
FACIALEXPRESSION: SMILING OR INEXPRESSIVE

## 2025-03-10 ASSESSMENT — PAIN SCALES - GENERAL
PAINLEVEL_OUTOF10: 0 - NO PAIN

## 2025-03-10 ASSESSMENT — PAIN - FUNCTIONAL ASSESSMENT: PAIN_FUNCTIONAL_ASSESSMENT: PAINAD (PAIN ASSESSMENT IN ADVANCED DEMENTIA SCALE)

## 2025-03-10 ASSESSMENT — ACTIVITIES OF DAILY LIVING (ADL): LACK_OF_TRANSPORTATION: NO

## 2025-03-10 NOTE — CARE PLAN
The patient's goals for the shift include  comfort     The clinical goals for the shift include comfort

## 2025-03-10 NOTE — PROGRESS NOTES
03/10/25 0858   Discharge Planning   Living Arrangements Spouse/significant other   Support Systems Spouse/significant other;Children   Assistance Needed A&Ox1-2 (person/place) Patient is dependent on assist for all ADLs and is a christian lift-not ambulating; room air baseline -currently 1L NC; PCP Dr Melton; (patient has private nurse in home and private aides 2-3hrs a day x 7days)   Type of Residence Private residence   Number of Stairs to Enter Residence 0   Number of Stairs Within Residence 0   Do you have animals or pets at home? No   Who is requesting discharge planning? Provider   Home or Post Acute Services In home services   Expected Discharge Disposition Home H  (Home with new  Home Health (patient has private pay nurse and aide in the home))   Does the patient need discharge transport arranged? Yes   RoundTrip coordination needed? Yes   Has discharge transport been arranged? No   Financial Resource Strain   How hard is it for you to pay for the very basics like food, housing, medical care, and heating? Not hard   Housing Stability   In the last 12 months, was there a time when you were not able to pay the mortgage or rent on time? N   In the past 12 months, how many times have you moved where you were living? 0   At any time in the past 12 months, were you homeless or living in a shelter (including now)? N   Transportation Needs   In the past 12 months, has lack of transportation kept you from medical appointments or from getting medications? no   In the past 12 months, has lack of transportation kept you from meetings, work, or from getting things needed for daily living? No

## 2025-03-10 NOTE — PROGRESS NOTES
Reilly Feliz is a 89 y.o. male on day 4 of admission presenting with Hypoxia.    Subjective   Patient was seen and examined at bedside. No acute events overnight conveyed to us. He is awake and alert. Seems to indicate in a whispered tone he is okay.     Objective     Physical Exam  Constitutional:       General: He is not in acute distress.  Cardiovascular:      Rate and Rhythm: Normal rate.      Heart sounds:      No friction rub. No gallop.   Pulmonary:      Effort: No respiratory distress.      Breath sounds: Wheezing present.      Comments: Coarse breath sounds, on RA  Musculoskeletal:      Right lower leg: No edema.      Left lower leg: No edema.   Skin:     Comments: Facial lesion   Neurological:      Mental Status: He is alert.         Last Recorded Vitals  Blood pressure 136/83, pulse 95, temperature 37.1 °C (98.8 °F), resp. rate 16, height 1.829 m (6'), weight 110 kg (242 lb 8.1 oz), SpO2 100%.  Intake/Output last 3 Shifts:  I/O last 3 completed shifts:  In: 240 (2.2 mL/kg) [P.O.:240]  Out: 750 (6.8 mL/kg) [Urine:750 (0.2 mL/kg/hr)]  Weight: 110 kg     Relevant Results  Results for orders placed or performed during the hospital encounter of 03/06/25 (from the past 24 hours)   Magnesium   Result Value Ref Range    Magnesium 1.89 1.60 - 2.40 mg/dL   Renal Function Panel   Result Value Ref Range    Glucose 93 74 - 99 mg/dL    Sodium 138 136 - 145 mmol/L    Potassium 4.2 3.5 - 5.3 mmol/L    Chloride 101 98 - 107 mmol/L    Bicarbonate 29 21 - 32 mmol/L    Anion Gap 12 10 - 20 mmol/L    Urea Nitrogen 22 6 - 23 mg/dL    Creatinine 0.71 0.50 - 1.30 mg/dL    eGFR 88 >60 mL/min/1.73m*2    Calcium 8.4 (L) 8.6 - 10.3 mg/dL    Phosphorus 2.1 (L) 2.5 - 4.9 mg/dL    Albumin 3.0 (L) 3.4 - 5.0 g/dL   CBC   Result Value Ref Range    WBC 6.4 4.4 - 11.3 x10*3/uL    nRBC 0.0 0.0 - 0.0 /100 WBCs    RBC 3.82 (L) 4.50 - 5.90 x10*6/uL    Hemoglobin 11.1 (L) 13.5 - 17.5 g/dL    Hematocrit 33.0 (L) 41.0 - 52.0 %    MCV 86 80 -  100 fL    MCH 29.1 26.0 - 34.0 pg    MCHC 33.6 32.0 - 36.0 g/dL    RDW 15.6 (H) 11.5 - 14.5 %    Platelets 232 150 - 450 x10*3/uL   Protime-INR   Result Value Ref Range    Protime 29.8 (H) 9.8 - 12.4 seconds    INR 2.7 (H) 0.9 - 1.1   POCT GLUCOSE   Result Value Ref Range    POCT Glucose 85 74 - 99 mg/dL       Assessment/Plan   Assessment & Plan  Hypoxia    Hypotension    Pleural effusion on left    Aspiration pneumonia (Multi)    Reilly Feliz is a 89 y.o. 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 Admitted for AHRF 2/2 aspiration PNA vs pleural effusion.      ACUTE ISSUES:  #AHRF (resolved) 2/2 aspiration PNA vs CAP vs atelectasis  #L pleural effusion  -CXR: L sided pleural effusion and underlying infiltrate or atelectasis   -Urine cx -  -Urine Legionella/strep -  -BC negative  Plan:  -Continue ceftriaxone (3/6 -), Finished azithromycin (3/6 - 3/8)  -Nebs/DuoNebs, IS  -SLP cleared for minced/moist and honey thick liquids. Patient has severe dysphagia and continues to have high risk of aspiration.   -Pulmonology consulted; Keep Coumadin on hold for thoracentesis when INR<1.8      #Severe Parkinson's:   -Patient refusing vitals, monitoring, & meds  Plan:  -Home carbidopa-levodopa  mg 6x daily  -Palliative consulted- Discussion with family today     #Hypotension- resolved  -Likely 2/2 dehydration  Plan:  -CTM     #Facial lesion   -2-3 cm black, scabbing lesion reported to have grown per wife  -Previously put mupirocin on it which did not appear to help  Plan:  -Outpatient dermatology referral on DC  -Wound care consulted     CHRONIC ISSUES:  #A Fib: HOLD home warfarin and continue metoprolol  #HTN: HOLD home ramipril 5 mg daily (substituted for lisinopril 10 mg) for soft BP  #T2DM W/O insulin: Hold home metformin 500 mg BID and on SSI #1  #CAD s/p stent: Home ASA 81 mg daily     Fluid: Replete  PRN  Electrolytes: Replete PRN  Nutrition: minced/moist, honey thick liquids  GI PPx: none  DVT/PE PPx: warfarin HELD for possible procedure  Abx: ceftriaxone  IV Lines: PIV  O2: RA     Disposition: 89M admitted for AHRF 2/2 aspiration PNA vs pleural effusion. Discharge pending family discussion.    Jesu Person DO, PGY-1  Internal Medicine   3/10/25 at 8:51 AM.

## 2025-03-10 NOTE — PROGRESS NOTES
Reilly Feliz is a 89 y.o. male on day 4 of admission presenting with Hypoxia.    Subjective   History obtained from patient's wife who is at bedside. She states that his breathing is the same as yesterday. He ate lunch a half hour before my evaluation and wife states he has been having a very wet cough since then.       Objective   GEN: mask-like facies. Does not appear to be struggling to breathe.  CV: RRR, no m/g/r  LUNGS: poor effort, wet cough. Diminished breath sounds at left base  EXT: no edema, cyanosis, clubbing    Physical Exam    Last Recorded Vitals  Blood pressure 96/67, pulse 85, temperature 36.8 °C (98.2 °F), temperature source Temporal, resp. rate 18, height 1.829 m (6'), weight 110 kg (242 lb 8.1 oz), SpO2 96%.  Intake/Output last 3 Shifts:  I/O last 3 completed shifts:  In: 240 (2.2 mL/kg) [P.O.:240]  Out: 750 (6.8 mL/kg) [Urine:750 (0.2 mL/kg/hr)]  Weight: 110 kg     Relevant Results  Scheduled medications  albuterol, 3 mL, nebulization, TID  aspirin, 81 mg, oral, Daily  carbidopa-levodopa, 1 tablet, oral, 6x daily  cefTRIAXone, 1 g, intravenous, q24h  eucerin, 1 Application, Topical, BID  insulin lispro, 0-5 Units, subcutaneous, TID AC  [Held by provider] lisinopril, 10 mg, oral, Daily  magnesium oxide, 400 mg, oral, Daily  metoprolol tartrate, 50 mg, oral, BID  oxygen, , inhalation, Continuous - Inhalation  polyethylene glycol, 17 g, oral, Daily  sennosides-docusate sodium, 2 tablet, oral, BID  sodium phosphate, 15 mmol, intravenous, Once  [Held by provider] warfarin, 5 mg, oral, Daily  zinc oxide, 1 Application, Topical, TID      Continuous medications     PRN medications  PRN medications: acetaminophen, albuterol, dextrose, dextrose, glucagon, glucagon, melatonin, ondansetron **OR** ondansetron, oxygen    Results for orders placed or performed during the hospital encounter of 03/06/25 (from the past 24 hours)   Magnesium   Result Value Ref Range    Magnesium 1.89 1.60 - 2.40 mg/dL   Renal  Function Panel   Result Value Ref Range    Glucose 93 74 - 99 mg/dL    Sodium 138 136 - 145 mmol/L    Potassium 4.2 3.5 - 5.3 mmol/L    Chloride 101 98 - 107 mmol/L    Bicarbonate 29 21 - 32 mmol/L    Anion Gap 12 10 - 20 mmol/L    Urea Nitrogen 22 6 - 23 mg/dL    Creatinine 0.71 0.50 - 1.30 mg/dL    eGFR 88 >60 mL/min/1.73m*2    Calcium 8.4 (L) 8.6 - 10.3 mg/dL    Phosphorus 2.1 (L) 2.5 - 4.9 mg/dL    Albumin 3.0 (L) 3.4 - 5.0 g/dL   CBC   Result Value Ref Range    WBC 6.4 4.4 - 11.3 x10*3/uL    nRBC 0.0 0.0 - 0.0 /100 WBCs    RBC 3.82 (L) 4.50 - 5.90 x10*6/uL    Hemoglobin 11.1 (L) 13.5 - 17.5 g/dL    Hematocrit 33.0 (L) 41.0 - 52.0 %    MCV 86 80 - 100 fL    MCH 29.1 26.0 - 34.0 pg    MCHC 33.6 32.0 - 36.0 g/dL    RDW 15.6 (H) 11.5 - 14.5 %    Platelets 232 150 - 450 x10*3/uL   Protime-INR   Result Value Ref Range    Protime 29.8 (H) 9.8 - 12.4 seconds    INR 2.7 (H) 0.9 - 1.1   POCT GLUCOSE   Result Value Ref Range    POCT Glucose 85 74 - 99 mg/dL   POCT GLUCOSE   Result Value Ref Range    POCT Glucose 128 (H) 74 - 99 mg/dL                                   Assessment/Plan   Assessment & Plan  Pleural effusion on left    Aspiration pneumonia (Multi)    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 His hypoxia has resolved. INR today is 2.7.    Recommendations:  -As he does not appear to be in any extremis from respiratory status and is no longer hypoxic, will defer thoracentesis.  -Discussed the above recommendations with wife, and she is agreeable.   -As he appears to be aspirating, recommend NPO  DNR/DNI no ICU. Recommend further palliative care discussions. May be hospice appropriate  -Will follow up PRN      Domitila Iniguez DO  Pulmonary & Critical Care  3/10/2025 3:25 PM

## 2025-03-10 NOTE — CARE PLAN
The patient's goals for the shift include  rest    The clinical goals for the shift include tolerate weaning O2

## 2025-03-10 NOTE — PROGRESS NOTES
Speech-Language Pathology    Speech-Language Pathology Clinical Swallow Treatment    Patient Name: Reilly Feliz  MRN: [id number]  : 1935  Today's Date: 03/10/25  Start Time: 955  Stop Time:   Time Calculation (min): 22 min    ASSESSMENT  SLP TX Intervention Outcome: Making Progress Towards Goals  Treatment Tolerance: Patient tolerated treatment well    Impressions: Severe risk for aspiration of any oral intake. Safest diet continues to be pureed with IDDSI 3/honey thick liquids . Pt would benefit from ongoing skilled ST to minimize aspiration risk and ensure ongoing safety with the least restrictive diet.    PLAN  Is MBSS recommended? No, due to recent MBSS completed.  Recommended solid consistency: Pureed (IDDSI level 4)  Recommended liquid consistency: Moderately thick (IDDSI 3) / Honey-thick  Recommended medication administration: Crushed in puree  STRATEGIES:  Upright posture for po intake  Alternate liquids and solids  Encourage double swallows  Oral care after intake  Water between meals per Eddy Free Water Protocol: Pt may have sips of thin water under the following conditions only: no food present, within 2 hours after oral care, when fully alert and upright. All liquids besides water must be thickened.     Discharge recommendation: Unable to determine at this time; please see follow-up notes for DC recommendation.  Inpatient/Swing Bed or Outpatient: Inpatient  SLP TX Plan: Continue Plan of Care  SLP Plan: Skilled SLP  SLP Frequency: 2x per week  Duration: 2 weeks     Discussed POC: Patient  Patient/Caregiver Agreeable: Yes    SUBJECTIVE  Prior to Session Communication: Bedside nurse  RN cleared pt to participate in session  Respiratory status: Supplemental oxygen via NC  Positioning: Upright in bed  Pt was alert and cooperative for session.    Pain Assessment  Pain Assessment: PAINAD (Pain Assessment in Advanced Dementia Scale)  0-10 (Numeric) Pain Score: 0 - No pain  Lara-Oneil FACES Pain  Rating: No hurt  Unable to Self-Report Pain Reason: Nonverbal  PAINAD Score: 0    Orientation: Did not correctly report any orientation information.    Ability to follow functional commands: Does not follow verbal commands without cues    OBJECTIVE  Therapeutic Swallow Intervention : Caregiver Education  Review of diet consistency recommendations, use of thickeners and concept of SILENT ASPIRATION. The patient's wife demonstrated improving comprehension, but still demonstrating evidence of confusion re: the absence of coughing and aspiration.     Treatment/Education:  Results and recommendations were relayed to: Family  Education provided: Yes   Learner: Significant other   Barriers to learning: None   Method of teaching: Verbal, Written, and Demonstration   Topic: role of ST, results of assessment, risk for aspiration, recommended diet modifications, recommended safe swallow strategies, swallow anatomy/physiology, and recommendation for dysphagia follow-up   Outcome of teaching: Caregiver demonstrated good understanding     Short term goals established 03/07/25:   Pt will tolerate a pureed diet with IDDSI 3/honey thick liquids without dysphagia.   Status: the patient has been snacking on small portions of meals per his wife. He completed a Magic Cup successfully.      His wife will demonstrate comprehension of patient's risk for aspiration and strategies to maximize functional safety independently.   Status: improving, but evidence of further instruction/ education required.

## 2025-03-11 LAB
ALBUMIN SERPL BCP-MCNC: 3 G/DL (ref 3.4–5)
ANION GAP SERPL CALC-SCNC: 8 MMOL/L (ref 10–20)
BACTERIA BLD CULT: NORMAL
BACTERIA BLD CULT: NORMAL
BUN SERPL-MCNC: 21 MG/DL (ref 6–23)
CALCIUM SERPL-MCNC: 8 MG/DL (ref 8.6–10.3)
CHLORIDE SERPL-SCNC: 101 MMOL/L (ref 98–107)
CO2 SERPL-SCNC: 32 MMOL/L (ref 21–32)
CREAT SERPL-MCNC: 0.73 MG/DL (ref 0.5–1.3)
EGFRCR SERPLBLD CKD-EPI 2021: 88 ML/MIN/1.73M*2
ERYTHROCYTE [DISTWIDTH] IN BLOOD BY AUTOMATED COUNT: 15.8 % (ref 11.5–14.5)
GLUCOSE BLD MANUAL STRIP-MCNC: 106 MG/DL (ref 74–99)
GLUCOSE BLD MANUAL STRIP-MCNC: 110 MG/DL (ref 74–99)
GLUCOSE BLD MANUAL STRIP-MCNC: 113 MG/DL (ref 74–99)
GLUCOSE BLD MANUAL STRIP-MCNC: 116 MG/DL (ref 74–99)
GLUCOSE SERPL-MCNC: 98 MG/DL (ref 74–99)
HCT VFR BLD AUTO: 32.2 % (ref 41–52)
HGB BLD-MCNC: 10.9 G/DL (ref 13.5–17.5)
INR PPP: 2.3 (ref 0.9–1.1)
MAGNESIUM SERPL-MCNC: 1.89 MG/DL (ref 1.6–2.4)
MCH RBC QN AUTO: 29.4 PG (ref 26–34)
MCHC RBC AUTO-ENTMCNC: 33.9 G/DL (ref 32–36)
MCV RBC AUTO: 87 FL (ref 80–100)
NRBC BLD-RTO: 0 /100 WBCS (ref 0–0)
PHOSPHATE SERPL-MCNC: 2.6 MG/DL (ref 2.5–4.9)
PLATELET # BLD AUTO: 243 X10*3/UL (ref 150–450)
POTASSIUM SERPL-SCNC: 3.9 MMOL/L (ref 3.5–5.3)
PROTHROMBIN TIME: 26 SECONDS (ref 9.8–12.4)
RBC # BLD AUTO: 3.71 X10*6/UL (ref 4.5–5.9)
SODIUM SERPL-SCNC: 137 MMOL/L (ref 136–145)
WBC # BLD AUTO: 5.9 X10*3/UL (ref 4.4–11.3)

## 2025-03-11 PROCEDURE — 36415 COLL VENOUS BLD VENIPUNCTURE: CPT | Performed by: BEHAVIOR TECHNICIAN

## 2025-03-11 PROCEDURE — 94760 N-INVAS EAR/PLS OXIMETRY 1: CPT

## 2025-03-11 PROCEDURE — 85027 COMPLETE CBC AUTOMATED: CPT | Performed by: BEHAVIOR TECHNICIAN

## 2025-03-11 PROCEDURE — 2500000001 HC RX 250 WO HCPCS SELF ADMINISTERED DRUGS (ALT 637 FOR MEDICARE OP)

## 2025-03-11 PROCEDURE — 80069 RENAL FUNCTION PANEL: CPT | Performed by: BEHAVIOR TECHNICIAN

## 2025-03-11 PROCEDURE — 1200000002 HC GENERAL ROOM WITH TELEMETRY DAILY

## 2025-03-11 PROCEDURE — 2500000002 HC RX 250 W HCPCS SELF ADMINISTERED DRUGS (ALT 637 FOR MEDICARE OP, ALT 636 FOR OP/ED): Performed by: INTERNAL MEDICINE

## 2025-03-11 PROCEDURE — 2500000001 HC RX 250 WO HCPCS SELF ADMINISTERED DRUGS (ALT 637 FOR MEDICARE OP): Performed by: BEHAVIOR TECHNICIAN

## 2025-03-11 PROCEDURE — 82947 ASSAY GLUCOSE BLOOD QUANT: CPT

## 2025-03-11 PROCEDURE — 2500000004 HC RX 250 GENERAL PHARMACY W/ HCPCS (ALT 636 FOR OP/ED)

## 2025-03-11 PROCEDURE — 31720 CLEARANCE OF AIRWAYS: CPT

## 2025-03-11 PROCEDURE — 83735 ASSAY OF MAGNESIUM: CPT | Performed by: BEHAVIOR TECHNICIAN

## 2025-03-11 PROCEDURE — 94664 DEMO&/EVAL PT USE INHALER: CPT

## 2025-03-11 PROCEDURE — 94640 AIRWAY INHALATION TREATMENT: CPT

## 2025-03-11 PROCEDURE — 2500000004 HC RX 250 GENERAL PHARMACY W/ HCPCS (ALT 636 FOR OP/ED): Performed by: BEHAVIOR TECHNICIAN

## 2025-03-11 PROCEDURE — 99232 SBSQ HOSP IP/OBS MODERATE 35: CPT

## 2025-03-11 PROCEDURE — 85610 PROTHROMBIN TIME: CPT

## 2025-03-11 RX ADMIN — METOPROLOL TARTRATE 50 MG: 50 TABLET, FILM COATED ORAL at 20:00

## 2025-03-11 RX ADMIN — ASPIRIN 81 MG: 81 TABLET, COATED ORAL at 08:47

## 2025-03-11 RX ADMIN — CARBIDOPA AND LEVODOPA 1 TABLET: 25; 100 TABLET ORAL at 18:34

## 2025-03-11 RX ADMIN — METOPROLOL TARTRATE 50 MG: 50 TABLET, FILM COATED ORAL at 08:47

## 2025-03-11 RX ADMIN — Medication 1 APPLICATION: at 16:42

## 2025-03-11 RX ADMIN — SKIN PROTECTANT 1 APPLICATION: 33 OINTMENT TOPICAL at 20:00

## 2025-03-11 RX ADMIN — Medication 400 MG: at 08:47

## 2025-03-11 RX ADMIN — CARBIDOPA AND LEVODOPA 1 TABLET: 25; 100 TABLET ORAL at 10:14

## 2025-03-11 RX ADMIN — CARBIDOPA AND LEVODOPA 1 TABLET: 25; 100 TABLET ORAL at 12:54

## 2025-03-11 RX ADMIN — SKIN PROTECTANT 1 APPLICATION: 33 OINTMENT TOPICAL at 09:00

## 2025-03-11 RX ADMIN — Medication 1 APPLICATION: at 20:00

## 2025-03-11 RX ADMIN — ALBUTEROL SULFATE 3 ML: 2.5 SOLUTION RESPIRATORY (INHALATION) at 13:47

## 2025-03-11 RX ADMIN — SENNOSIDES AND DOCUSATE SODIUM 2 TABLET: 50; 8.6 TABLET ORAL at 08:47

## 2025-03-11 RX ADMIN — SENNOSIDES AND DOCUSATE SODIUM 2 TABLET: 50; 8.6 TABLET ORAL at 20:00

## 2025-03-11 RX ADMIN — POLYETHYLENE GLYCOL 3350 17 G: 17 POWDER, FOR SOLUTION ORAL at 08:47

## 2025-03-11 RX ADMIN — CARBIDOPA AND LEVODOPA 1 TABLET: 25; 100 TABLET ORAL at 22:13

## 2025-03-11 RX ADMIN — CARBIDOPA AND LEVODOPA 1 TABLET: 25; 100 TABLET ORAL at 06:00

## 2025-03-11 RX ADMIN — CARBIDOPA AND LEVODOPA 1 TABLET: 25; 100 TABLET ORAL at 16:44

## 2025-03-11 RX ADMIN — ALBUTEROL SULFATE 3 ML: 2.5 SOLUTION RESPIRATORY (INHALATION) at 08:18

## 2025-03-11 RX ADMIN — Medication 1 APPLICATION: at 09:00

## 2025-03-11 ASSESSMENT — COGNITIVE AND FUNCTIONAL STATUS - GENERAL
MOBILITY SCORE: 6
CLIMB 3 TO 5 STEPS WITH RAILING: TOTAL
DAILY ACTIVITIY SCORE: 7
DRESSING REGULAR UPPER BODY CLOTHING: TOTAL
HELP NEEDED FOR BATHING: TOTAL
WALKING IN HOSPITAL ROOM: TOTAL
EATING MEALS: A LOT
DRESSING REGULAR LOWER BODY CLOTHING: TOTAL
MOVING FROM LYING ON BACK TO SITTING ON SIDE OF FLAT BED WITH BEDRAILS: TOTAL
CLIMB 3 TO 5 STEPS WITH RAILING: TOTAL
DRESSING REGULAR LOWER BODY CLOTHING: TOTAL
DAILY ACTIVITIY SCORE: 7
PERSONAL GROOMING: TOTAL
TOILETING: TOTAL
MOBILITY SCORE: 6
TURNING FROM BACK TO SIDE WHILE IN FLAT BAD: TOTAL
EATING MEALS: A LOT
TOILETING: TOTAL
MOVING FROM LYING ON BACK TO SITTING ON SIDE OF FLAT BED WITH BEDRAILS: TOTAL
STANDING UP FROM CHAIR USING ARMS: TOTAL
MOVING TO AND FROM BED TO CHAIR: TOTAL
HELP NEEDED FOR BATHING: TOTAL
TURNING FROM BACK TO SIDE WHILE IN FLAT BAD: TOTAL
PERSONAL GROOMING: TOTAL
STANDING UP FROM CHAIR USING ARMS: TOTAL
WALKING IN HOSPITAL ROOM: TOTAL
MOVING TO AND FROM BED TO CHAIR: TOTAL
DRESSING REGULAR UPPER BODY CLOTHING: TOTAL

## 2025-03-11 ASSESSMENT — PAIN SCALES - GENERAL
PAINLEVEL_OUTOF10: 0 - NO PAIN
PAINLEVEL_OUTOF10: 0 - NO PAIN

## 2025-03-11 NOTE — CONSULTS
Consults    PALLIATIVE CARE SOCIAL WORK CONSULT:  Radha POOLE  CURRENT ATTENDING PROVIDER: Parrish Alves,      Reason for Consult    GOC    Introduction to Palliative Care  Met with pt and spouse at bedside  Pt was alert and very difficult to understand, very soft voice.  Staff present:  Radha Sotelo  Palliative care was introduced as a service for patients with serious illness to help with symptoms, assist with goals of care conversations, navigate complex decision making, improve quality of life for patients, and provide support to patients and families.  Support and empathy was provided throughout the encounter.    History of Present Illness  Reilly Feliz is a 89 y.o. male with past medical history of Parkinson's with dementia, afib, CAD. Pt is presenting with hypoxia.    Caregiving/Caregiver Support  Does the patient require assistance in some or all components of his care, including coordination of medical care?  yes  If Yes, which person serves that role?  Spouse,  RN, hired [employer]     Medical History  Per EMR.     Personal History  Pt has been  58 years.  Pt has three children, sons local, dtr in [place].  Pt is [employer], served three years.  Not active at [employer].     Lutheran and Importance of Lutheran:  Congregation, not active    Depression  denies    Anxiety  denies      Advance Directives  Surrogate Health Care Decision Maker:  spouse  HCPOA  unsure  Living Will   unsure    Code Status                    DNR DNI NO ICU    Serious Illness Conversation        Life Limiting Disease:  parkinson's, dysphagia  Disease Specific Information Provided:  ongoing risk of dysphagia, risks associated with PEG  What is your understanding now of where you are with your illness:   spouse states understanding  What are your fears, worries, or concerns about the future:   none expressed  What are you hoping for:  to be able to care for pt at home  How much does your family know about your priorities and wishes:   ongoing discussion     Other distressing Symptoms        Excessive secretions.  Overall decline.     Plan and Social Considerations  Pt has been active with [facility].  Reviewed benefits of hospice.  Spouse in agreement to meeting with hospice.    Referral sent to [facility] via [employer].   [facility] meeting 3/12/25 @ [id number].  Team updated.     Plan of Care discussed with: team    Thank you for asking Palliative Care to assist with care of this patient.  We will continue to follow  Please contact us for additional questions or concerns.    ZULEMA Armando  Palliative Care   Contact Via Epic Secure chat

## 2025-03-11 NOTE — CARE PLAN
The patient's goals for the shift include ANDRÉS    The clinical goals for the shift include tolerate weaning O2      Problem: Pain - Adult  Goal: Verbalizes/displays adequate comfort level or baseline comfort level  Outcome: Progressing     Problem: Safety - Adult  Goal: Free from fall injury  Outcome: Progressing     Problem: Discharge Planning  Goal: Discharge to home or other facility with appropriate resources  Outcome: Progressing     Problem: Chronic Conditions and Co-morbidities  Goal: Patient's chronic conditions and co-morbidity symptoms are monitored and maintained or improved  Outcome: Progressing     Problem: Nutrition  Goal: Nutrient intake appropriate for maintaining nutritional needs  Outcome: Progressing     Problem: Skin  Goal: Decreased wound size/increased tissue granulation at next dressing change  Outcome: Progressing  Flowsheets (Taken 3/10/2025 2306)  Decreased wound size/increased tissue granulation at next dressing change:   Promote sleep for wound healing   Protective dressings over bony prominences  Goal: Participates in plan/prevention/treatment measures  Outcome: Progressing  Flowsheets (Taken 3/10/2025 2306)  Participates in plan/prevention/treatment measures: Elevate heels  Goal: Prevent/manage excess moisture  Outcome: Progressing  Flowsheets (Taken 3/10/2025 2306)  Prevent/manage excess moisture:   Cleanse incontinence/protect with barrier cream   Moisturize dry skin   Follow provider orders for dressing changes  Goal: Prevent/minimize sheer/friction injuries  Outcome: Progressing  Flowsheets (Taken 3/10/2025 2306)  Prevent/minimize sheer/friction injuries:   HOB 30 degrees or less   Turn/reposition every 2 hours/use positioning/transfer devices  Goal: Promote/optimize nutrition  Outcome: Progressing  Flowsheets (Taken 3/10/2025 2306)  Promote/optimize nutrition:   Assist with feeding   Monitor/record intake including meals   Offer water/supplements/favorite foods   Consume > 50%  meals/supplements  Goal: Promote skin healing  Outcome: Progressing  Flowsheets (Taken 3/10/2025 2306)  Promote skin healing:   Protective dressings over bony prominences   Turn/reposition every 2 hours/use positioning/transfer devices

## 2025-03-11 NOTE — CARE PLAN
The patient's goals for the shift include      The clinical goals for the shift include Patient to tolerate room air

## 2025-03-11 NOTE — PROGRESS NOTES
Reilly Feliz is a 89 y.o. male on day 5 of admission presenting with Hypoxia.      Subjective   Patient was seen and examined at bedside. Patient denies new or worsening symptoms.  Cough improved, no other complaints currently.    Objective     Last Recorded Vitals  /88 (BP Location: Right arm, Patient Position: Lying)   Pulse 82   Temp 36.7 °C (98.1 °F) (Temporal)   Resp 16   Wt 110 kg (242 lb 8.1 oz)   SpO2 98%   Intake/Output last 3 Shifts:    Intake/Output Summary (Last 24 hours) at 3/11/2025 0734  Last data filed at 3/11/2025 0548  Gross per 24 hour   Intake 480 ml   Output 375 ml   Net 105 ml       Admission Weight  Weight: 109 kg (240 lb) (03/06/25 1713)    Daily Weight  03/06/25 : 110 kg (242 lb 8.1 oz)    Image Results  ECG 12 lead  Atrial fibrillation  Low voltage QRS  Possible Anterior infarct , age undetermined  Abnormal ECG  When compared with ECG of 06-DEC-2021 20:29,  Borderline criteria for Anterior infarct are now Present  Nonspecific T wave abnormality now evident in Anterior leads  See ED provider note for full interpretation and clinical correlation  Confirmed by Rubina Huitron ([id number]) on 3/8/2025 10:32:43 AM      Physical Exam  Constitutional:       General: He is not in acute distress.     Appearance: He is ill-appearing.   Cardiovascular:      Rate and Rhythm: Normal rate.      Heart sounds:      No friction rub. No gallop.   Pulmonary:      Effort: No respiratory distress.   Neurological:      Mental Status: He is alert. Mental status is at baseline.         Relevant Results  No results found.    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 Admitted for AHRF 2/2 aspiration PNA vs pleural effusion.      ACUTE ISSUES:  #Aspiration PNA  #L pleural effusion  -CXR: L sided pleural effusion and underlying infiltrate or atelectasis   -Urine cx -, Urine Legionella/strep -, BC negative  Plan:  -Stop ceftriaxone (3/6  3/11-), Finished azithromycin (3/6 - 3/8)  -Nebs/DuoNebs, IS  -SLP cleared for minced/moist and honey thick liquids. Patient has severe dysphagia and continues to have high risk of aspiration.   -Pulmonology consulted; no plans for Thora     #Severe Parkinson's:   -Patient refusing vitals, monitoring, & meds  Plan:  -Home carbidopa-levodopa  mg 6x daily  -Palliative consulted: Hospice consult for Parkinson's with dysphagia     #Facial lesion   -2-3 cm black, scabbing lesion reported to have grown per wife  -Previously put mupirocin on it which did not appear to help  Plan:  -Outpatient dermatology referral on DC  -Wound care consulted     CHRONIC ISSUES:  #A Fib: HOLD home warfarin and continue metoprolol  #HTN: HOLD home ramipril 5 mg daily (substituted for lisinopril 10 mg) for soft BP  #T2DM W/O insulin: Hold home metformin 500 mg BID and on SSI #1  #CAD s/p stent: Home ASA 81 mg daily     Fluid: Replete PRN  Electrolytes: Replete PRN  Nutrition: minced/moist, honey thick liquids  GI PPx: none  DVT/PE PPx: warfarin HELD for possible procedure  Abx: ceftriaxone  IV Lines: PIV  O2: RA     Disposition: 89M admitted for AHRF 2/2 aspiration PNA.  Hospice consulted for Parkinson's W/ dysphagia.    Jesu Person DO, PGY-1  Internal Medicine   3/11/25 at 7:34 AM.

## 2025-03-12 LAB
GLUCOSE BLD MANUAL STRIP-MCNC: 105 MG/DL (ref 74–99)
GLUCOSE BLD MANUAL STRIP-MCNC: 114 MG/DL (ref 74–99)
GLUCOSE BLD MANUAL STRIP-MCNC: 120 MG/DL (ref 74–99)

## 2025-03-12 PROCEDURE — 82947 ASSAY GLUCOSE BLOOD QUANT: CPT

## 2025-03-12 PROCEDURE — 2500000004 HC RX 250 GENERAL PHARMACY W/ HCPCS (ALT 636 FOR OP/ED)

## 2025-03-12 PROCEDURE — 1200000002 HC GENERAL ROOM WITH TELEMETRY DAILY

## 2025-03-12 PROCEDURE — 99232 SBSQ HOSP IP/OBS MODERATE 35: CPT

## 2025-03-12 PROCEDURE — 2500000001 HC RX 250 WO HCPCS SELF ADMINISTERED DRUGS (ALT 637 FOR MEDICARE OP): Performed by: BEHAVIOR TECHNICIAN

## 2025-03-12 PROCEDURE — 94760 N-INVAS EAR/PLS OXIMETRY 1: CPT

## 2025-03-12 PROCEDURE — 94640 AIRWAY INHALATION TREATMENT: CPT

## 2025-03-12 PROCEDURE — 2500000002 HC RX 250 W HCPCS SELF ADMINISTERED DRUGS (ALT 637 FOR MEDICARE OP, ALT 636 FOR OP/ED): Performed by: INTERNAL MEDICINE

## 2025-03-12 PROCEDURE — 2500000001 HC RX 250 WO HCPCS SELF ADMINISTERED DRUGS (ALT 637 FOR MEDICARE OP)

## 2025-03-12 RX ADMIN — METOPROLOL TARTRATE 50 MG: 50 TABLET, FILM COATED ORAL at 08:37

## 2025-03-12 RX ADMIN — CARBIDOPA AND LEVODOPA 1 TABLET: 25; 100 TABLET ORAL at 16:32

## 2025-03-12 RX ADMIN — METOPROLOL TARTRATE 50 MG: 50 TABLET, FILM COATED ORAL at 21:24

## 2025-03-12 RX ADMIN — Medication 1 APPLICATION: at 15:00

## 2025-03-12 RX ADMIN — CARBIDOPA AND LEVODOPA 1 TABLET: 25; 100 TABLET ORAL at 10:12

## 2025-03-12 RX ADMIN — CARBIDOPA AND LEVODOPA 1 TABLET: 25; 100 TABLET ORAL at 21:24

## 2025-03-12 RX ADMIN — SENNOSIDES AND DOCUSATE SODIUM 2 TABLET: 50; 8.6 TABLET ORAL at 08:37

## 2025-03-12 RX ADMIN — SKIN PROTECTANT 1 APPLICATION: 33 OINTMENT TOPICAL at 08:36

## 2025-03-12 RX ADMIN — Medication 1 APPLICATION: at 21:26

## 2025-03-12 RX ADMIN — SKIN PROTECTANT 1 APPLICATION: 33 OINTMENT TOPICAL at 21:25

## 2025-03-12 RX ADMIN — Medication 400 MG: at 08:37

## 2025-03-12 RX ADMIN — ALBUTEROL SULFATE 3 ML: 2.5 SOLUTION RESPIRATORY (INHALATION) at 15:20

## 2025-03-12 RX ADMIN — CARBIDOPA AND LEVODOPA 1 TABLET: 25; 100 TABLET ORAL at 19:05

## 2025-03-12 RX ADMIN — CARBIDOPA AND LEVODOPA 1 TABLET: 25; 100 TABLET ORAL at 12:31

## 2025-03-12 RX ADMIN — CARBIDOPA AND LEVODOPA 1 TABLET: 25; 100 TABLET ORAL at 06:23

## 2025-03-12 RX ADMIN — ASPIRIN 81 MG: 81 TABLET, COATED ORAL at 08:37

## 2025-03-12 RX ADMIN — Medication 1 APPLICATION: at 08:36

## 2025-03-12 ASSESSMENT — PAIN SCALES - GENERAL
PAINLEVEL_OUTOF10: 0 - NO PAIN
PAINLEVEL_OUTOF10: 0 - NO PAIN

## 2025-03-12 ASSESSMENT — COGNITIVE AND FUNCTIONAL STATUS - GENERAL
WALKING IN HOSPITAL ROOM: TOTAL
HELP NEEDED FOR BATHING: TOTAL
MOBILITY SCORE: 6
TURNING FROM BACK TO SIDE WHILE IN FLAT BAD: TOTAL
DRESSING REGULAR LOWER BODY CLOTHING: TOTAL
STANDING UP FROM CHAIR USING ARMS: TOTAL
MOVING FROM LYING ON BACK TO SITTING ON SIDE OF FLAT BED WITH BEDRAILS: TOTAL
DAILY ACTIVITIY SCORE: 7
CLIMB 3 TO 5 STEPS WITH RAILING: TOTAL
PERSONAL GROOMING: TOTAL
EATING MEALS: A LOT
DRESSING REGULAR UPPER BODY CLOTHING: TOTAL
MOVING TO AND FROM BED TO CHAIR: TOTAL
TOILETING: TOTAL

## 2025-03-12 NOTE — PROGRESS NOTES
"Music Therapy Note    Reilly Feliz was referred by Riddhi Olivares, MELIA, MT-BC.    Therapy Session  Referral Type: New referral this admission  Visit Type: New visit  Session Start Time: 1028  Session End Time: 1034  Intervention Delivery: In-person  Conflict of Service: None  Number of family members present: 1  Family Present for Session: Spouse/Significant Other  Family Participation: Interactive     Pre-assessment  Mood/Affect: Tired/lethargic         Treatment/Interventions  Music Therapy Interventions: Assessment  Interruption: No    Post-assessment  Unable to Assess Reason: Did not provide expressive therapy intervention  Mood/Affect: Tired/lethargic  Total Session Time (min): 6 minutes    Narrative  Assessment Detail: Pt was sitting upright in bed watching TV with his wife at bedside when approached by MT. Pt was clearly tired but open to speaking briefly with MT. His wife was pleasant and easily engaged. MT provided education on services, specifically introducing herself and services as a resource. Pt was open to education, but ultimately declined, stating \"I'm not really a music deniz.\" Pt's wife confirmed this, sharing that they mostly enjoyed watching their grandchildren playing in their school bands. 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 She asked questions about what might be involved in music therapy sessions, and MT answered. Pt and wife encouraged to reach out with any other questions or if they change their mind about services. Pt and wife denied any other needs at this time.  Follow-up: MT will follow up as able and appropriate.    Education Documentation  Resources, taught by Riddhi Olivares at 3/12/2025 11:40 AM.  Learner: Significant Other, Patient  Readiness: Acceptance  Method: Explanation  Response: Verbalizes Understanding            "

## 2025-03-12 NOTE — PROGRESS NOTES
Reilly Feliz is a 89 y.o. male on day 6 of admission presenting with Hypoxia.      Subjective   Patient was seen and examined at bedside. Patient denies new or worsening symptoms. Refuses morning labs & meds, and states he declined music therapy yesterday.     Objective     Last Recorded Vitals  /73 (BP Location: Right arm, Patient Position: Lying)   Pulse 78   Temp 36.4 °C (97.5 °F) (Temporal)   Resp 20   Wt 110 kg (242 lb 8.1 oz)   SpO2 91%   Intake/Output last 3 Shifts:    Intake/Output Summary (Last 24 hours) at 3/12/2025 0755  Last data filed at 3/12/2025 0500  Gross per 24 hour   Intake 120 ml   Output 450 ml   Net -330 ml       Admission Weight  Weight: 109 kg (240 lb) (03/06/25 1713)    Daily Weight  03/06/25 : 110 kg (242 lb 8.1 oz)    Image Results  ECG 12 lead  Atrial fibrillation  Low voltage QRS  Possible Anterior infarct , age undetermined  Abnormal ECG  When compared with ECG of 06-DEC-2021 20:29,  Borderline criteria for Anterior infarct are now Present  Nonspecific T wave abnormality now evident in Anterior leads  See ED provider note for full interpretation and clinical correlation  Confirmed by Rubina Huitron ([id number]) on 3/8/2025 10:32:43 AM      Physical Exam  Constitutional:       General: He is not in acute distress.  Cardiovascular:      Rate and Rhythm: Normal rate.      Heart sounds:      No friction rub. No gallop.   Pulmonary:      Effort: No respiratory distress.      Breath sounds: Wheezing present.      Comments: Poor effort  Neurological:      Mental Status: He is alert.         Relevant Results  No results found.    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 Admitted for AHRF 2/2 aspiration PNA vs pleural effusion.      ACUTE ISSUES:  #Aspiration PNA  #L pleural effusion  -CXR: L sided pleural effusion and underlying infiltrate or atelectasis   -Urine cx -, Urine Legionella/strep -, BC negative  Plan:  -Completed ceftriaxone (3/6- 3/11), Finished azithromycin (3/6 - 3/8)  -Nebs/DuoNebs, IS  -SLP cleared for minced/moist and honey thick liquids. Patient has severe dysphagia and continues to have high risk of aspiration.   -Pulmonology consulted; no plans for Thora     #Severe Parkinsons  #Parkinsons Dysphagia   -Patient refusing vitals, monitoring, & meds  Plan:  -Home carbidopa-levodopa  mg 6x daily  -Likely will go hospice w/ diagnosis of Parkinson's with dysphagia  -Hospice consulted: Pending meeting  3/12/25 @ 1630/1700     #Facial lesion   -2-3 cm black, scabbing lesion reported to have grown per wife  -Previously put mupirocin on it which did not appear to help  Plan:  -Outpatient dermatology referral on DC  -Wound care consulted     CHRONIC ISSUES:  #A Fib: HOLD home warfarin and continue metoprolol  #HTN: HOLD home ramipril 5 mg daily (substituted for lisinopril 10 mg) for soft BP  #T2DM W/O insulin: Hold home metformin 500 mg BID and on SSI #1  #CAD s/p stent: Home ASA 81 mg daily     Fluid: Replete PRN  Electrolytes: Replete PRN  Nutrition: minced/moist, honey thick liquids  GI PPx: none  DVT/PE PPx: warfarin HELD for possible procedure  Abx: -  IV Lines: PIV  O2: RA     Disposition: 89M admitted for AHRF 2/2 aspiration PNA.  Plan for possible hospice for Parkinson's W/ dysphagia- pending meeting  3/12/25 @ 1630/1700    Jesu Person DO, PGY-1  Internal Medicine   3/12/25 at 7:56 AM.

## 2025-03-12 NOTE — PROGRESS NOTES
03/12/25 1107   Discharge Planning   Living Arrangements Spouse/significant other   Support Systems Spouse/significant other;Children   Assistance Needed A&Ox1-2 (person/place) Patient is dependent on assist for all ADLs and is a christian lift-not ambulating; room air baseline -currently 1L NC; PCP Dr Melton; (patient has private nurse in home and private aides 2-3hrs a day x 7days)   Type of Residence Private residence   Number of Stairs to Enter Residence 0   Number of Stairs Within Residence 0   Do you have animals or pets at home? No   Home or Post Acute Services In home services   Type of Home Care Services Home nursing visits;Home OT;Home PT   Expected Discharge Disposition Home H  ([facility] 3/12 @ 1630 vs new [facility](SN/PT/OT))   Stroke Family Assessment   Stroke Family Assessment Needed No   Intensity of Service   Intensity of Service 0-30 min

## 2025-03-12 NOTE — PROGRESS NOTES
Nutrition Follow Up Assessment:   Nutrition Assessment       Patient is a 89 y.o. male with h/o Parkinson's Disease, now presenting for hypoxia 2/2 suspected aspiration PNA. Antibiotic regimen started on admit.     Pt re-evaluated by SLP (3/10), recommend a pureed diet w/ honey thick liquids.     Pt now planned for meeting w/ hospice this evening.     Nutrition History:  Food and Nutrient History: Visit made, pt sleeping in bed w/ wife at bedside. Wife reports pt continues to not eat much. Mainly taking a couple of bites off each item on the tray totaling ~25% of meals. She notes pt seems to like the Magic Cups but dislikes the peach barber Gelatein. Is amenable to trial of pineapple Gelatein instead. No additional needs at this time.  Food Allergy:  (None)     Anthropometrics:  Height: 182.9 cm (6')   Weight: 110 kg (242 lb 8.1 oz)   BMI (Calculated): 32.88  IBW/kg (Dietitian Calculated): 80.9 kg  Percent of IBW: 136 %     Weight History:   Weight         3/6/2025  1713 3/6/2025  2123          Weight: 109 kg (240 lb) 110 kg (242 lb 8.1 oz)       Weight Change %:  Weight History / % Weight Change: No additional weights in EMR since admission. Unable to assess for changes.    Nutrition Focused Physical Exam Findings:  Defer: Pt sleeping at time of visit    Edema:  Edema:  (Non-pitting generalized/BLE)    Physical Findings:  Skin: Positive (Sacral, L upper back, R upper back, Medial lower back, & L upper face wounds)  Digestive System Findings:  (None)  Mouth Findings: Dysphagia    Nutrition Significant Labs:  BMP Trend:   Results from last 7 days   Lab Units 03/11/25  0611 03/10/25  0623 03/08/25  0558 03/07/25  0621   GLUCOSE mg/dL 98 93 109* 187*   CALCIUM mg/dL 8.0* 8.4* 8.5* 8.6   SODIUM mmol/L 137 138 136 132*   POTASSIUM mmol/L 3.9 4.2 4.5 4.7   CO2 mmol/L 32 29 28 27   CHLORIDE mmol/L 101 101 99 98   BUN mg/dL 21 22 29* 32*   CREATININE mg/dL 0.73 0.71 0.90 1.05      Nutrition Specific Medications:  Scheduled  medications  insulin lispro, 0-5 Units, subcutaneous, TID AC  magnesium oxide, 400 mg, oral, Daily  polyethylene glycol, 17 g, oral, Daily  sennosides-docusate sodium, 2 tablet, oral, BID  [Held by provider] warfarin, 5 mg, oral, Daily    I/O:   Last BM Date: 03/12/25; Stool Appearance: Formed, Soft (03/12/25 1009)    Dietary Orders (From admission, onward)       Start     Ordered    03/07/25 1457  Oral nutritional supplements  Until discontinued        Comments: Vanilla/butter pecan flavor preference   Question Answer Comment   Deliver with Breakfast    Deliver with Dinner    Select supplement: Magic Cup        03/07/25 1456    03/07/25 1457  Oral nutritional supplements  Until discontinued        Question Answer Comment   Deliver with Lunch    Select supplement: Gelatein MCT        03/07/25 1457 03/06/25 2109  May Participate in Room Service With Assistance  ( ROOM SERVICE MAY PARTICIPATE WITH ASSISTANCE)  Once        Question:  .  Answer:  Yes    03/06/25 2108 03/06/25 2058  Adult diet Regular; Minced and moist 5; Moderately thick 3; Spoon feed only  Diet effective now        Question Answer Comment   Diet type Regular    Texture Minced and moist 5    Fluid consistency Moderately thick 3    Select tray type: Spoon feed only        03/06/25 2057             Estimated Needs:   Total Energy Estimated Needs in 24 hours (kCal):  (1690-4476)  Method for Estimating Needs: 25-27 kcals/kg x IBW  Total Protein Estimated Needs in 24 Hours (g):  (95+)  Method for Estimating 24 Hour Protein Needs: ~1.2 g/kg x IBW  Total Fluid Estimated Needs in 24 Hours (mL):  (2000-2200)  Method for Estimating 24 Hour Fluid Needs: 1mL/kcal or per team        Nutrition Diagnosis      Nutrition Diagnosis  Patient has Nutrition Diagnosis: Yes  Diagnosis Status (1): Active  Nutrition Diagnosis 1: Swallowing difficulty  Related to (1): dysphagia  As Evidenced by (1): suspected aspiration PNA w/ need for modified diet       Nutrition  Interventions/Recommendations      Nutrition Recommendations:  Continue current diet as ordered, pending hospice meeting this afternoon  If continued full care desired, change to a pureed diet w/ honey-thickened liquids per most recent SLP recs      Nutrition Interventions/Goals:   Interventions: Medical food supplement  Medical Food Supplement: Commercial food medical food supplement therapy  Goal: Magic Cup BID (290 kcals/9g protein each)  +  Gelatein Plus daily (160 kcals/20g protein each)      Nutrition Monitoring and Evaluation   Food/Nutrient Related History Monitoring  Monitoring and Evaluation Plan: Estimated Energy Intake  Estimated Energy Intake: Energy intake 50 -75% of estimated energy needs    Time Spent (min): 60 minutes

## 2025-03-12 NOTE — PROGRESS NOTES
Palliative Care Social Work Note     Met with pt and spouse.  Pt awake but not verbal.   Reviewed POC, anticipate dc 3/13.  Spouse verbalized understanding.

## 2025-03-12 NOTE — CARE PLAN
The patient's goals for the shift include ANDRÉS    The clinical goals for the shift include Patient to tolerate room air    Problem: Pain - Adult  Goal: Verbalizes/displays adequate comfort level or baseline comfort level  Outcome: Progressing     Problem: Safety - Adult  Goal: Free from fall injury  Outcome: Progressing     Problem: Discharge Planning  Goal: Discharge to home or other facility with appropriate resources  Outcome: Progressing     Problem: Chronic Conditions and Co-morbidities  Goal: Patient's chronic conditions and co-morbidity symptoms are monitored and maintained or improved  Outcome: Progressing     Problem: Nutrition  Goal: Nutrient intake appropriate for maintaining nutritional needs  Outcome: Progressing     Problem: Skin  Goal: Decreased wound size/increased tissue granulation at next dressing change  Outcome: Progressing  Flowsheets (Taken 3/11/2025 2313)  Decreased wound size/increased tissue granulation at next dressing change: Promote sleep for wound healing  Goal: Participates in plan/prevention/treatment measures  Outcome: Progressing  Flowsheets (Taken 3/11/2025 2313)  Participates in plan/prevention/treatment measures: Elevate heels  Goal: Prevent/manage excess moisture  Outcome: Progressing  Flowsheets (Taken 3/11/2025 2313)  Prevent/manage excess moisture:   Cleanse incontinence/protect with barrier cream   Moisturize dry skin  Goal: Prevent/minimize sheer/friction injuries  Outcome: Progressing  Flowsheets (Taken 3/11/2025 2313)  Prevent/minimize sheer/friction injuries:   HOB 30 degrees or less   Turn/reposition every 2 hours/use positioning/transfer devices  Goal: Promote/optimize nutrition  Outcome: Progressing  Flowsheets (Taken 3/11/2025 2313)  Promote/optimize nutrition:   Assist with feeding   Monitor/record intake including meals   Consume > 50% meals/supplements   Offer water/supplements/favorite foods  Goal: Promote skin healing  Outcome: Progressing  Flowsheets (Taken  3/11/2025 6719)  Promote skin healing:   Turn/reposition every 2 hours/use positioning/transfer devices   Protective dressings over bony prominences

## 2025-03-13 VITALS
HEIGHT: 72 IN | TEMPERATURE: 97 F | RESPIRATION RATE: 16 BRPM | OXYGEN SATURATION: 99 % | SYSTOLIC BLOOD PRESSURE: 130 MMHG | WEIGHT: 242.51 LBS | DIASTOLIC BLOOD PRESSURE: 80 MMHG | BODY MASS INDEX: 32.85 KG/M2 | HEART RATE: 90 BPM

## 2025-03-13 PROCEDURE — 2500000004 HC RX 250 GENERAL PHARMACY W/ HCPCS (ALT 636 FOR OP/ED)

## 2025-03-13 PROCEDURE — 99238 HOSP IP/OBS DSCHRG MGMT 30/<: CPT

## 2025-03-13 PROCEDURE — 82947 ASSAY GLUCOSE BLOOD QUANT: CPT

## 2025-03-13 PROCEDURE — 2500000001 HC RX 250 WO HCPCS SELF ADMINISTERED DRUGS (ALT 637 FOR MEDICARE OP): Performed by: BEHAVIOR TECHNICIAN

## 2025-03-13 PROCEDURE — 2500000001 HC RX 250 WO HCPCS SELF ADMINISTERED DRUGS (ALT 637 FOR MEDICARE OP)

## 2025-03-13 PROCEDURE — 2500000004 HC RX 250 GENERAL PHARMACY W/ HCPCS (ALT 636 FOR OP/ED): Performed by: BEHAVIOR TECHNICIAN

## 2025-03-13 RX ADMIN — ASPIRIN 81 MG: 81 TABLET, COATED ORAL at 10:34

## 2025-03-13 RX ADMIN — SENNOSIDES AND DOCUSATE SODIUM 2 TABLET: 50; 8.6 TABLET ORAL at 10:34

## 2025-03-13 RX ADMIN — Medication 1 APPLICATION: at 10:39

## 2025-03-13 RX ADMIN — POLYETHYLENE GLYCOL 3350 17 G: 17 POWDER, FOR SOLUTION ORAL at 10:34

## 2025-03-13 RX ADMIN — CARBIDOPA AND LEVODOPA 1 TABLET: 25; 100 TABLET ORAL at 10:40

## 2025-03-13 RX ADMIN — CARBIDOPA AND LEVODOPA 1 TABLET: 25; 100 TABLET ORAL at 06:20

## 2025-03-13 RX ADMIN — METOPROLOL TARTRATE 50 MG: 50 TABLET, FILM COATED ORAL at 10:34

## 2025-03-13 RX ADMIN — CARBIDOPA AND LEVODOPA 1 TABLET: 25; 100 TABLET ORAL at 12:46

## 2025-03-13 RX ADMIN — Medication 400 MG: at 10:34

## 2025-03-13 RX ADMIN — SKIN PROTECTANT 1 APPLICATION: 33 OINTMENT TOPICAL at 12:45

## 2025-03-13 NOTE — DISCHARGE SUMMARY
Discharge Diagnosis  Hospice due to Parkinsons Dysphagia  Severe Parkinson's  Aspiration PNA  L pleural effusion  Facial lesion   Afib  HTN  T2DM  CAD      Discharge Meds     Medication List      CONTINUE taking these medications     aspirin 81 mg EC tablet   carbidopa-levodopa  mg tablet; Commonly known as: Sinemet; TAKE 1   TABLET BY MOUTH 6 TIMES A DAY   metFORMIN 500 mg tablet; Commonly known as: Glucophage; TAKE 1 TABLET   TWICE DAILY   metoprolol tartrate 50 mg tablet; Commonly known as: Lopressor; TAKE 1   TABLET TWICE DAILY   * nystatin cream; Commonly known as: Mycostatin; APPLY TO AFFECTED AREA   TWICE A DAY   * nystatin cream; Commonly known as: Mycostatin; APPLY TO AFFECTED AREA   TWICE A DAY   ramipril 5 mg capsule; Commonly known as: Altace   warfarin 5 mg tablet; Commonly known as: Coumadin  * This list has 2 medication(s) that are the same as other medications   prescribed for you. Read the directions carefully, and ask your doctor or   other care provider to review them with you.       Test Results Pending At Discharge  Pending Labs       No current pending labs.            Hospital Course  Reilly Feliz is a 89 y.o. 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       ED Course:  In the ED, patient was put on 5L NC. CXR showed L sided pleural effusion and underlying infiltrate or atelectasis. Elevated lactate 2.5. Concern for aspiration PNA vs CAP vs atelectasis. Patient was started on ceftriaxone and azithromycin and admitted to the floor.    Hospital Course:  On the floor, patient continued to be treated with abx. Also treated with supportive care. SLP consulted for C/F aspiration. Pulmonology consulted for possible thoracentesis, holding home warfarin until INR appropriate for procedure. Patient  with clinical improvement and able to wean down supplemental oxygen. Patient refusing vital checks and meds on 3/9. Palliative consulted- code status changed to DNR DNI No ICU. Per palliative, family & patient opting for hospice. Hospice consulted, meeting scheduled for 3/12. Hospice decision confirmed for Parkinsons Dysphagia.    HDS for discharge with home hospice.    Pertinent Physical Exam At Time of Discharge  Physical Exam  Constitutional:       General: He is not in acute distress.     Appearance: He is ill-appearing.   HENT:      Head: Normocephalic.   Cardiovascular:      Rate and Rhythm: Normal rate.      Heart sounds:      No friction rub. No gallop.   Pulmonary:      Effort: No respiratory distress.   Neurological:      Mental Status: He is alert.         Outpatient Follow-Up  No future appointments.      Jesu Person DO

## 2025-03-13 NOTE — CARE PLAN
The patient's goals for the shift include  rest    The clinical goals for the shift include comfort       -Continue w/ abxs for VAP tx per I  -C/w tx acute Cdiff, as above.  -One set of bcx w/ Staph lugdunensis which have since cleared, being treated with cefazolin

## 2025-03-13 NOTE — PROGRESS NOTES
03/13/25 0800   Discharge Planning   Expected Discharge Disposition hospitals  (Consents signed for hospice [facility] and expected discharge to home with [facility] today.)

## 2025-03-19 LAB
GLUCOSE BLD MANUAL STRIP-MCNC: 100 MG/DL (ref 74–99)
GLUCOSE BLD MANUAL STRIP-MCNC: 107 MG/DL (ref 74–99)
GLUCOSE BLD MANUAL STRIP-MCNC: 111 MG/DL (ref 74–99)
GLUCOSE BLD MANUAL STRIP-MCNC: 117 MG/DL (ref 74–99)
GLUCOSE BLD MANUAL STRIP-MCNC: 126 MG/DL (ref 74–99)
GLUCOSE BLD MANUAL STRIP-MCNC: 155 MG/DL (ref 74–99)
GLUCOSE BLD MANUAL STRIP-MCNC: 174 MG/DL (ref 74–99)
GLUCOSE BLD MANUAL STRIP-MCNC: 189 MG/DL (ref 74–99)
GLUCOSE BLD MANUAL STRIP-MCNC: 242 MG/DL (ref 74–99)
GLUCOSE BLD MANUAL STRIP-MCNC: 85 MG/DL (ref 74–99)

## 2025-04-23 DIAGNOSIS — G20.A1 PARKINSON'S DISEASE WITHOUT FLUCTUATING MANIFESTATIONS, UNSPECIFIED WHETHER DYSKINESIA PRESENT: ICD-10-CM

## 2025-04-23 RX ORDER — CARBIDOPA AND LEVODOPA 25; 100 MG/1; MG/1
TABLET ORAL
Qty: 540 TABLET | Refills: 0 | Status: SHIPPED | OUTPATIENT
Start: 2025-04-23

## 2025-06-18 NOTE — NURSING NOTE
0620- Pt refusing to have blood drawn this AM. Dr. Mayra gurrola.  
1900 Assumed care of patient,   2115 assessment as charted, VSS, patient awake in bed, alert and oriented x3. Patient denies pain, nausea,. IV intact, bed in lowest and locked position. Bed alarm on and functioning. Call light within reach, safety maintained. Plan of care ongoing    
Patient is refusing labs and vital signs. Explained the importance of treatment and patient still denied.  
Walker

## 2025-07-10 ENCOUNTER — APPOINTMENT (OUTPATIENT)
Dept: NEUROLOGY | Facility: CLINIC | Age: 89
End: 2025-07-10
Payer: MEDICARE